# Patient Record
Sex: MALE | Race: WHITE | NOT HISPANIC OR LATINO | URBAN - METROPOLITAN AREA
[De-identification: names, ages, dates, MRNs, and addresses within clinical notes are randomized per-mention and may not be internally consistent; named-entity substitution may affect disease eponyms.]

---

## 2019-08-16 PROBLEM — Z00.00 ENCOUNTER FOR PREVENTIVE HEALTH EXAMINATION: Status: ACTIVE | Noted: 2019-08-16

## 2019-09-10 ENCOUNTER — INPATIENT (INPATIENT)
Facility: HOSPITAL | Age: 74
LOS: 3 days | Discharge: HOME | DRG: 862 | End: 2019-09-14
Attending: INTERNAL MEDICINE | Admitting: INTERNAL MEDICINE
Payer: MEDICARE

## 2019-09-10 VITALS
SYSTOLIC BLOOD PRESSURE: 144 MMHG | WEIGHT: 195.11 LBS | DIASTOLIC BLOOD PRESSURE: 58 MMHG | RESPIRATION RATE: 22 BRPM | TEMPERATURE: 100 F | HEART RATE: 69 BPM | OXYGEN SATURATION: 94 % | HEIGHT: 72 IN

## 2019-09-10 LAB
ALBUMIN SERPL ELPH-MCNC: 4.1 G/DL — SIGNIFICANT CHANGE UP (ref 3.3–5)
ALP SERPL-CCNC: 54 U/L — SIGNIFICANT CHANGE UP (ref 40–120)
ALT FLD-CCNC: 74 U/L — HIGH (ref 10–45)
ANION GAP SERPL CALC-SCNC: 11 MMOL/L — SIGNIFICANT CHANGE UP (ref 5–17)
ANION GAP SERPL CALC-SCNC: 12 MMOL/L — SIGNIFICANT CHANGE UP (ref 5–17)
APPEARANCE UR: ABNORMAL
AST SERPL-CCNC: 310 U/L — HIGH (ref 10–40)
BASOPHILS # BLD AUTO: 0.1 K/UL — SIGNIFICANT CHANGE UP (ref 0–0.2)
BASOPHILS NFR BLD AUTO: 0.4 % — SIGNIFICANT CHANGE UP (ref 0–2)
BILIRUB SERPL-MCNC: 0.9 MG/DL — SIGNIFICANT CHANGE UP (ref 0.2–1.2)
BILIRUB UR-MCNC: NEGATIVE — SIGNIFICANT CHANGE UP
BUN SERPL-MCNC: 19 MG/DL — SIGNIFICANT CHANGE UP (ref 7–23)
BUN SERPL-MCNC: 20 MG/DL — SIGNIFICANT CHANGE UP (ref 7–23)
CALCIUM SERPL-MCNC: 10.1 MG/DL — SIGNIFICANT CHANGE UP (ref 8.4–10.5)
CALCIUM SERPL-MCNC: 10.5 MG/DL — SIGNIFICANT CHANGE UP (ref 8.4–10.5)
CHLORIDE SERPL-SCNC: 102 MMOL/L — SIGNIFICANT CHANGE UP (ref 96–108)
CHLORIDE SERPL-SCNC: 104 MMOL/L — SIGNIFICANT CHANGE UP (ref 96–108)
CO2 SERPL-SCNC: 23 MMOL/L — SIGNIFICANT CHANGE UP (ref 22–31)
CO2 SERPL-SCNC: 24 MMOL/L — SIGNIFICANT CHANGE UP (ref 22–31)
COLOR SPEC: YELLOW — SIGNIFICANT CHANGE UP
CREAT SERPL-MCNC: 1.54 MG/DL — HIGH (ref 0.5–1.3)
CREAT SERPL-MCNC: 1.55 MG/DL — HIGH (ref 0.5–1.3)
DIFF PNL FLD: ABNORMAL
EOSINOPHIL # BLD AUTO: 0.1 K/UL — SIGNIFICANT CHANGE UP (ref 0–0.5)
EOSINOPHIL NFR BLD AUTO: 0.5 % — SIGNIFICANT CHANGE UP (ref 0–6)
GAS PNL BLDV: SIGNIFICANT CHANGE UP
GLUCOSE SERPL-MCNC: 114 MG/DL — HIGH (ref 70–99)
GLUCOSE SERPL-MCNC: 116 MG/DL — HIGH (ref 70–99)
GLUCOSE UR QL: NEGATIVE — SIGNIFICANT CHANGE UP
HCT VFR BLD CALC: 53.2 % — HIGH (ref 39–50)
HGB BLD-MCNC: 16.3 G/DL — SIGNIFICANT CHANGE UP (ref 13–17)
KETONES UR-MCNC: NEGATIVE — SIGNIFICANT CHANGE UP
LEUKOCYTE ESTERASE UR-ACNC: ABNORMAL
LIDOCAIN IGE QN: 14 U/L — SIGNIFICANT CHANGE UP (ref 7–60)
LYMPHOCYTES # BLD AUTO: 0.2 K/UL — LOW (ref 1–3.3)
LYMPHOCYTES # BLD AUTO: 1.3 % — LOW (ref 13–44)
MCHC RBC-ENTMCNC: 29.6 PG — SIGNIFICANT CHANGE UP (ref 27–34)
MCHC RBC-ENTMCNC: 30.7 GM/DL — LOW (ref 32–36)
MCV RBC AUTO: 96.6 FL — SIGNIFICANT CHANGE UP (ref 80–100)
MONOCYTES # BLD AUTO: 1.1 K/UL — HIGH (ref 0–0.9)
MONOCYTES NFR BLD AUTO: 6.8 % — SIGNIFICANT CHANGE UP (ref 2–14)
NEUTROPHILS # BLD AUTO: 15.4 K/UL — HIGH (ref 1.8–7.4)
NEUTROPHILS NFR BLD AUTO: 91.1 % — HIGH (ref 43–77)
NITRITE UR-MCNC: NEGATIVE — SIGNIFICANT CHANGE UP
PH UR: 6.5 — SIGNIFICANT CHANGE UP (ref 5–8)
PLATELET # BLD AUTO: 115 K/UL — LOW (ref 150–400)
POTASSIUM SERPL-MCNC: 4.3 MMOL/L — SIGNIFICANT CHANGE UP (ref 3.5–5.3)
POTASSIUM SERPL-MCNC: 6.1 MMOL/L — HIGH (ref 3.5–5.3)
POTASSIUM SERPL-SCNC: 4.3 MMOL/L — SIGNIFICANT CHANGE UP (ref 3.5–5.3)
POTASSIUM SERPL-SCNC: 6.1 MMOL/L — HIGH (ref 3.5–5.3)
PROT SERPL-MCNC: 7.5 G/DL — SIGNIFICANT CHANGE UP (ref 6–8.3)
PROT UR-MCNC: 100 — SIGNIFICANT CHANGE UP
RBC # BLD: 5.51 M/UL — SIGNIFICANT CHANGE UP (ref 4.2–5.8)
RBC # FLD: 12.5 % — SIGNIFICANT CHANGE UP (ref 10.3–14.5)
SODIUM SERPL-SCNC: 138 MMOL/L — SIGNIFICANT CHANGE UP (ref 135–145)
SODIUM SERPL-SCNC: 138 MMOL/L — SIGNIFICANT CHANGE UP (ref 135–145)
SP GR SPEC: 1.01 — SIGNIFICANT CHANGE UP (ref 1.01–1.02)
UROBILINOGEN FLD QL: NEGATIVE — SIGNIFICANT CHANGE UP
WBC # BLD: 16.9 K/UL — HIGH (ref 3.8–10.5)
WBC # FLD AUTO: 16.9 K/UL — HIGH (ref 3.8–10.5)

## 2019-09-10 PROCEDURE — 99285 EMERGENCY DEPT VISIT HI MDM: CPT | Mod: GC

## 2019-09-10 PROCEDURE — 74177 CT ABD & PELVIS W/CONTRAST: CPT | Mod: 26

## 2019-09-10 RX ORDER — ACETAMINOPHEN 500 MG
650 TABLET ORAL ONCE
Refills: 0 | Status: COMPLETED | OUTPATIENT
Start: 2019-09-10 | End: 2019-09-10

## 2019-09-10 RX ORDER — SODIUM CHLORIDE 9 MG/ML
1000 INJECTION INTRAMUSCULAR; INTRAVENOUS; SUBCUTANEOUS ONCE
Refills: 0 | Status: COMPLETED | OUTPATIENT
Start: 2019-09-10 | End: 2019-09-10

## 2019-09-10 RX ORDER — CEFTRIAXONE 500 MG/1
1000 INJECTION, POWDER, FOR SOLUTION INTRAMUSCULAR; INTRAVENOUS ONCE
Refills: 0 | Status: COMPLETED | OUTPATIENT
Start: 2019-09-10 | End: 2019-09-10

## 2019-09-10 RX ADMIN — Medication 650 MILLIGRAM(S): at 21:00

## 2019-09-10 RX ADMIN — CEFTRIAXONE 100 MILLIGRAM(S): 500 INJECTION, POWDER, FOR SOLUTION INTRAMUSCULAR; INTRAVENOUS at 22:02

## 2019-09-10 RX ADMIN — SODIUM CHLORIDE 1000 MILLILITER(S): 9 INJECTION INTRAMUSCULAR; INTRAVENOUS; SUBCUTANEOUS at 23:30

## 2019-09-10 RX ADMIN — CEFTRIAXONE 1000 MILLIGRAM(S): 500 INJECTION, POWDER, FOR SOLUTION INTRAMUSCULAR; INTRAVENOUS at 23:30

## 2019-09-10 RX ADMIN — SODIUM CHLORIDE 1000 MILLILITER(S): 9 INJECTION INTRAMUSCULAR; INTRAVENOUS; SUBCUTANEOUS at 22:02

## 2019-09-10 NOTE — ED PROVIDER NOTE - NS ED ROS FT
GENERAL: chills, //             EYES: no change in vision, //             HEENT: no trouble swallowing or speaking, //             CARDIAC: no chest pain, //              PULMONARY: no cough or SOB, //             GI: mid epigastric abd pain, //             : No changes in urination,  //            SKIN: no rashes,  //            NEURO: generalized weakness,  //             MSK: No joint pain otherwise as HPI or negative. ~Bobby Cameron DO PGY1

## 2019-09-10 NOTE — ED PROVIDER NOTE - OBJECTIVE STATEMENT
73 yo m pmh BPH, lewy body dementia, hld, pw fatigue. pw wife who provides collateral. states last week on Thursday pt had LP performed as part of study at [facility] for LBD biomarkers. afterwards he had a few episodes of  non bloody diarrhea which have resolved. yesterday morning pt had prostate biopsy performed. since then he has been excessively "fatigued", sleeping throughout yesterday and today which is unusual for pt. pt reports last night when awakening in middle of night to urinate pt was "weak" unable to stand and go to bathroom so he crawled to and from bathroom. reports chills today. denies fevers. denies dysuria, cp, sob, ha, n/v, bowel/bladder incontinence.

## 2019-09-10 NOTE — ED PROVIDER NOTE - PHYSICAL EXAMINATION
General: well appearing, interactive, well nourished, no apparent distress, ncat  HEENT: EOMI, PERRLA, normal mucosa, normal oropharynx, no lesions on the lips or on oral mucosa, normal external ear  Neck: supple, no lymphadenopathy, full range of motion, no nuchal rigidity  CV: RRR, normal S1 and S2 with no murmur, capillary refill less than two seconds  Resp: lungs CTA b/l, good aeration bilaterally, symmetric chest wall   Abd: non-distended, soft, non-tender in all 4q  : no CVA tenderness  MSK: full range of motion, no cyanosis, no edema, no clubbing, no immobility, no spinal ml ttp  Neuro: CN II-XII grossly intact, muscle strength 5/5 in all extremities, silt in all exremities  Skin: no rashes, skin intact

## 2019-09-10 NOTE — ED ADULT TRIAGE NOTE - CHIEF COMPLAINT QUOTE
fever, weakness, shaking since this morning s/p prostate biopsy yesterday. Last tylenol 11:30am today. Pt states "I couldn't walk or stand yesterday on my own last night." Lumbar puncture last Thursday.

## 2019-09-10 NOTE — ED ADULT NURSE NOTE - OBJECTIVE STATEMENT
75 y/o male with history of Lewy body dementia, presents to ED via wheelchair, accompanied by wife, c/o weakness. Patient a&ox3, with wife providing history due to patient's dementia. As per wife, patient had an LP performed last Thursday for a study on Lewy body dementia. Wife reports after procedure, patient had a few episodes of diarrhea that have since resolved. Wife reports yesterday, patient had a prostate biopsy done, placed on ABX. States patient has been feeling generalized weakness but since biopsy has felt even more weak and fatigue. Unable to stand last night. Patient got up to use bathroom last night and fell over in bed hitting right shoulder onto dresser. Had to crawl to bathroom. Patient endorses urinary frequency, but no pain with urination.  reports chills today. denies fevers. denies dysuria, cp, sob, ha, n/v, bowel/bladder incontinence 73 y/o male with history of Lewy body dementia, presents to ED via wheelchair, accompanied by wife, c/o weakness. Patient a&ox3, with wife providing history due to patient's dementia. As per wife, patient had an LP performed last Thursday for a study on Lewy body dementia. Wife reports after procedure, patient had a few episodes of diarrhea that have since resolved. Wife reports yesterday, patient had a prostate biopsy done, placed on ABX. States patient has been feeling generalized weakness but since biopsy has felt even more weak and fatigue. Unable to stand last night. Patient got up to use bathroom last night and fell over in bed hitting right shoulder onto dresser. Had to crawl to bathroom. Patient endorses urinary frequency, but no pain with urination. +chills today. Denies CP, SOB, HA, N/V/D, abdominal pain, cough, incontinence, dizziness, vision changes, blood in urine. Lungs clear b/l. Abd soft, nontender, nondistended. EKG done. MD Henriquez at bedside for eval.

## 2019-09-10 NOTE — ED PROVIDER NOTE - CLINICAL SUMMARY MEDICAL DECISION MAKING FREE TEXT BOX
75 yo m presents after LP last week and prostate biopsy w/ subjective chills and fatigue, and po temperature in ED. pt has no neuro deficits, no findings on physical exam. due to recent procedures will scan pt, obtain labs, give tylenol. likely infectious etiology. reassess.

## 2019-09-10 NOTE — ED ADULT NURSE NOTE - NSIMPLEMENTINTERV_GEN_ALL_ED
Implemented All Fall Risk Interventions:  [place] to call system. Call bell, personal items and telephone within reach. Instruct patient to call for assistance. Room bathroom lighting operational. Non-slip footwear when patient is off stretcher. Physically safe environment: no spills, clutter or unnecessary equipment. Stretcher in lowest position, wheels locked, appropriate side rails in place. Provide visual cue, wrist band, yellow gown, etc. Monitor gait and stability. Monitor for mental status changes and reorient to person, place, and time. Review medications for side effects contributing to fall risk. Reinforce activity limits and safety measures with patient and family.

## 2019-09-10 NOTE — ED PROVIDER NOTE - ATTENDING CONTRIBUTION TO CARE
Private Physician [facility], Romi BAILEY, PCP  74y male pmh Dementia,HLD,Bipolar,Pt comes to ed complains of fatigue, chills, without dysruia, Pt had LP 6d ago and prostate bx, with diarrhea following day, Yesterday had prostate bx and symptoms of fatigue since then, PE WDWN normocephalic atraumatic neck supple chest clear cv no rubs, gallops or murmurs neruo no focal defect power 5/5 all extr pain light touch intact  Dereck Griffin MD, Facep

## 2019-09-10 NOTE — ED PROVIDER NOTE - PROGRESS NOTE DETAILS
Endorsed to Dr ANDREW Griffin MD, Facep Patient reassessed, NAD, non-toxic appearing. abx started. results dw pt/family, questions answered. uro and pcp paged  - Bobby Cameron D.O. PGY2

## 2019-09-11 DIAGNOSIS — E78.5 HYPERLIPIDEMIA, UNSPECIFIED: ICD-10-CM

## 2019-09-11 DIAGNOSIS — G31.83 NEUROCOGNITIVE DISORDER WITH LEWY BODIES: ICD-10-CM

## 2019-09-11 DIAGNOSIS — N30.01 ACUTE CYSTITIS WITH HEMATURIA: ICD-10-CM

## 2019-09-11 DIAGNOSIS — A41.9 SEPSIS, UNSPECIFIED ORGANISM: ICD-10-CM

## 2019-09-11 LAB
ALBUMIN SERPL ELPH-MCNC: 3.6 G/DL — SIGNIFICANT CHANGE UP (ref 3.3–5)
ALP SERPL-CCNC: 49 U/L — SIGNIFICANT CHANGE UP (ref 40–120)
ALT FLD-CCNC: 70 U/L — HIGH (ref 10–45)
ANION GAP SERPL CALC-SCNC: 12 MMOL/L — SIGNIFICANT CHANGE UP (ref 5–17)
AST SERPL-CCNC: 262 U/L — HIGH (ref 10–40)
BASOPHILS # BLD AUTO: 0 K/UL — SIGNIFICANT CHANGE UP (ref 0–0.2)
BASOPHILS NFR BLD AUTO: 0 % — SIGNIFICANT CHANGE UP (ref 0–2)
BILIRUB SERPL-MCNC: 0.7 MG/DL — SIGNIFICANT CHANGE UP (ref 0.2–1.2)
BUN SERPL-MCNC: 20 MG/DL — SIGNIFICANT CHANGE UP (ref 7–23)
CALCIUM SERPL-MCNC: 9.7 MG/DL — SIGNIFICANT CHANGE UP (ref 8.4–10.5)
CHLORIDE SERPL-SCNC: 106 MMOL/L — SIGNIFICANT CHANGE UP (ref 96–108)
CO2 SERPL-SCNC: 22 MMOL/L — SIGNIFICANT CHANGE UP (ref 22–31)
CREAT SERPL-MCNC: 1.48 MG/DL — HIGH (ref 0.5–1.3)
E COLI DNA BLD POS QL NAA+NON-PROBE: SIGNIFICANT CHANGE UP
E COLI DNA BLD POS QL NAA+NON-PROBE: SIGNIFICANT CHANGE UP
EOSINOPHIL # BLD AUTO: 0 K/UL — SIGNIFICANT CHANGE UP (ref 0–0.5)
EOSINOPHIL NFR BLD AUTO: 0 % — SIGNIFICANT CHANGE UP (ref 0–6)
GLUCOSE SERPL-MCNC: 132 MG/DL — HIGH (ref 70–99)
GRAM STN FLD: SIGNIFICANT CHANGE UP
HCT VFR BLD CALC: 45.3 % — SIGNIFICANT CHANGE UP (ref 39–50)
HCV AB S/CO SERPL IA: 0.13 S/CO — SIGNIFICANT CHANGE UP (ref 0–0.99)
HCV AB SERPL-IMP: SIGNIFICANT CHANGE UP
HGB BLD-MCNC: 14.7 G/DL — SIGNIFICANT CHANGE UP (ref 13–17)
LYMPHOCYTES # BLD AUTO: 0.54 K/UL — LOW (ref 1–3.3)
LYMPHOCYTES # BLD AUTO: 3.5 % — LOW (ref 13–44)
MANUAL SMEAR VERIFICATION: SIGNIFICANT CHANGE UP
MCHC RBC-ENTMCNC: 30.8 PG — SIGNIFICANT CHANGE UP (ref 27–34)
MCHC RBC-ENTMCNC: 32.5 GM/DL — SIGNIFICANT CHANGE UP (ref 32–36)
MCV RBC AUTO: 94.8 FL — SIGNIFICANT CHANGE UP (ref 80–100)
METHOD TYPE: SIGNIFICANT CHANGE UP
METHOD TYPE: SIGNIFICANT CHANGE UP
MONOCYTES # BLD AUTO: 0.8 K/UL — SIGNIFICANT CHANGE UP (ref 0–0.9)
MONOCYTES NFR BLD AUTO: 5.2 % — SIGNIFICANT CHANGE UP (ref 2–14)
NEUTROPHILS # BLD AUTO: 14.04 K/UL — HIGH (ref 1.8–7.4)
NEUTROPHILS NFR BLD AUTO: 91.3 % — HIGH (ref 43–77)
PLAT MORPH BLD: NORMAL — SIGNIFICANT CHANGE UP
PLATELET # BLD AUTO: 105 K/UL — LOW (ref 150–400)
POTASSIUM SERPL-MCNC: 4.5 MMOL/L — SIGNIFICANT CHANGE UP (ref 3.5–5.3)
POTASSIUM SERPL-SCNC: 4.5 MMOL/L — SIGNIFICANT CHANGE UP (ref 3.5–5.3)
PROT SERPL-MCNC: 6.4 G/DL — SIGNIFICANT CHANGE UP (ref 6–8.3)
RBC # BLD: 4.78 M/UL — SIGNIFICANT CHANGE UP (ref 4.2–5.8)
RBC # FLD: 13.6 % — SIGNIFICANT CHANGE UP (ref 10.3–14.5)
RBC BLD AUTO: SIGNIFICANT CHANGE UP
SODIUM SERPL-SCNC: 140 MMOL/L — SIGNIFICANT CHANGE UP (ref 135–145)
SPECIMEN SOURCE: SIGNIFICANT CHANGE UP
SPECIMEN SOURCE: SIGNIFICANT CHANGE UP
WBC # BLD: 15.38 K/UL — HIGH (ref 3.8–10.5)
WBC # FLD AUTO: 15.38 K/UL — HIGH (ref 3.8–10.5)

## 2019-09-11 RX ORDER — SODIUM CHLORIDE 9 MG/ML
1000 INJECTION INTRAMUSCULAR; INTRAVENOUS; SUBCUTANEOUS
Refills: 0 | Status: DISCONTINUED | OUTPATIENT
Start: 2019-09-11 | End: 2019-09-12

## 2019-09-11 RX ORDER — CEFTRIAXONE 500 MG/1
1000 INJECTION, POWDER, FOR SOLUTION INTRAMUSCULAR; INTRAVENOUS EVERY 24 HOURS
Refills: 0 | Status: DISCONTINUED | OUTPATIENT
Start: 2019-09-11 | End: 2019-09-11

## 2019-09-11 RX ORDER — SIMVASTATIN 20 MG/1
5 TABLET, FILM COATED ORAL AT BEDTIME
Refills: 0 | Status: DISCONTINUED | OUTPATIENT
Start: 2019-09-11 | End: 2019-09-14

## 2019-09-11 RX ORDER — ACETAMINOPHEN 500 MG
650 TABLET ORAL ONCE
Refills: 0 | Status: COMPLETED | OUTPATIENT
Start: 2019-09-11 | End: 2019-09-11

## 2019-09-11 RX ORDER — ACETAMINOPHEN 500 MG
650 TABLET ORAL EVERY 6 HOURS
Refills: 0 | Status: DISCONTINUED | OUTPATIENT
Start: 2019-09-11 | End: 2019-09-14

## 2019-09-11 RX ORDER — ONDANSETRON 8 MG/1
4 TABLET, FILM COATED ORAL EVERY 6 HOURS
Refills: 0 | Status: DISCONTINUED | OUTPATIENT
Start: 2019-09-11 | End: 2019-09-14

## 2019-09-11 RX ORDER — LITHIUM CARBONATE 300 MG/1
200 TABLET, EXTENDED RELEASE ORAL
Refills: 0 | Status: DISCONTINUED | OUTPATIENT
Start: 2019-09-11 | End: 2019-09-11

## 2019-09-11 RX ORDER — DONEPEZIL HYDROCHLORIDE 10 MG/1
10 TABLET, FILM COATED ORAL AT BEDTIME
Refills: 0 | Status: DISCONTINUED | OUTPATIENT
Start: 2019-09-11 | End: 2019-09-14

## 2019-09-11 RX ORDER — LITHIUM CARBONATE 300 MG/1
450 TABLET, EXTENDED RELEASE ORAL
Refills: 0 | Status: DISCONTINUED | OUTPATIENT
Start: 2019-09-11 | End: 2019-09-14

## 2019-09-11 RX ORDER — LANOLIN ALCOHOL/MO/W.PET/CERES
10 CREAM (GRAM) TOPICAL AT BEDTIME
Refills: 0 | Status: DISCONTINUED | OUTPATIENT
Start: 2019-09-11 | End: 2019-09-14

## 2019-09-11 RX ORDER — ERTAPENEM SODIUM 1 G/1
1000 INJECTION, POWDER, LYOPHILIZED, FOR SOLUTION INTRAMUSCULAR; INTRAVENOUS EVERY 24 HOURS
Refills: 0 | Status: DISCONTINUED | OUTPATIENT
Start: 2019-09-11 | End: 2019-09-13

## 2019-09-11 RX ADMIN — Medication 650 MILLIGRAM(S): at 01:30

## 2019-09-11 RX ADMIN — Medication 650 MILLIGRAM(S): at 00:40

## 2019-09-11 RX ADMIN — ERTAPENEM SODIUM 120 MILLIGRAM(S): 1 INJECTION, POWDER, LYOPHILIZED, FOR SOLUTION INTRAMUSCULAR; INTRAVENOUS at 15:10

## 2019-09-11 RX ADMIN — LITHIUM CARBONATE 450 MILLIGRAM(S): 300 TABLET, EXTENDED RELEASE ORAL at 08:39

## 2019-09-11 RX ADMIN — Medication 10 MILLIGRAM(S): at 21:55

## 2019-09-11 RX ADMIN — Medication 650 MILLIGRAM(S): at 01:00

## 2019-09-11 RX ADMIN — DONEPEZIL HYDROCHLORIDE 10 MILLIGRAM(S): 10 TABLET, FILM COATED ORAL at 21:55

## 2019-09-11 RX ADMIN — SODIUM CHLORIDE 75 MILLILITER(S): 9 INJECTION INTRAMUSCULAR; INTRAVENOUS; SUBCUTANEOUS at 04:27

## 2019-09-11 NOTE — H&P ADULT - NSHPLABSRESULTS_GEN_ALL_CORE
LABS:                        16.3   16.9  )-----------( 115      ( 10 Sep 2019 20:58 )             53.2     09-10    138  |  104  |  19  ----------------------------<  114<H>  4.3   |  23  |  1.55<H>    Ca    10.1      10 Sep 2019 22:15    TPro  7.5  /  Alb  4.1  /  TBili  0.9  /  DBili  x   /  AST  310<H>  /  ALT  74<H>  /  AlkPhos  54  09-10      CAPILLARY BLOOD GLUCOSE            Urinalysis Basic - ( 10 Sep 2019 20:58 )    Color: Yellow / Appearance: Slightly Turbid / S.013 / pH: x  Gluc: x / Ketone: Negative  / Bili: Negative / Urobili: Negative   Blood: x / Protein: 100 / Nitrite: Negative   Leuk Esterase: Large / RBC: 17 /hpf /  /HPF   Sq Epi: x / Non Sq Epi: 0 /hpf / Bacteria: Negative        RADIOLOGY & ADDITIONAL TESTS:    Imaging Personally Reviewed:  [x] YES  [ ] NO    Consultant(s) Notes Reviewed:  [x] YES  [ ] NO    Care Discussed with Consultants/Other Providers [x] YES  [ ] NO

## 2019-09-11 NOTE — CONSULT NOTE ADULT - PROBLEM SELECTOR RECOMMENDATION 9
secondary to prostatitis  f/u blood and urine cx  change to ertapenem  monitor wbc and temp curve  ct noted

## 2019-09-11 NOTE — CONSULT NOTE ADULT - SUBJECTIVE AND OBJECTIVE BOX
HPI: pt 73y/o M PMHx BPH, lewy body dementia, hld. come to ED c/o fatigue and fever. pt had elevated PSA and s/p prostate biopsy w/ Dr. Sawyer on 2019. Pt start PO abx 2 days before the bx and cont. after the bx. Post-op pt feel well and went home. Since this morning, pt states chill and feel fatigue. frequency urination and urgency, some rectal pain and lower abdominal discomfort. Denies hematuria, dysuria and other urinary ssx. CT show inflammation of prostate.      PAST MEDICAL & SURGICAL HISTORY:  HLD (hyperlipidemia)  Lewy body dementia without behavioral disturbance    FAMILY HISTORY:    SOCIAL HISTORY:   Tobacco hx:  No Known Allergies      REVIEW OF SYSTEMS: Pertinent positives and negatives as stated in HPI, otherwise negative    Vital signs      Output      Physical Exam  Gen: NAD  Pulm: No respiratory distress, no subcostal retractions  CV: RRR, no JVD  Abd: Soft, NT, ND  : voiding free  MSK: No edema present    LABS:        09-10 @ 22:15    WBC --    / Hct --    / SCr 1.55     09-10 @ 20:58    WBC 16.9  / Hct 53.2  / SCr 1.54     09-10    138  |  104  |  19  ----------------------------<  114<H>  4.3   |  23  |  1.55<H>    Ca    10.1      10 Sep 2019 22:15    TPro  7.5  /  Alb  4.1  /  TBili  0.9  /  DBili  x   /  AST  310<H>  /  ALT  74<H>  /  AlkPhos  54  09-10      Urinalysis Basic - ( 10 Sep 2019 20:58 )    Color: Yellow / Appearance: Slightly Turbid / S.013 / pH: x  Gluc: x / Ketone: Negative  / Bili: Negative / Urobili: Negative   Blood: x / Protein: 100 / Nitrite: Negative   Leuk Esterase: Large / RBC: 17 /hpf /  /HPF   Sq Epi: x / Non Sq Epi: 0 /hpf / Bacteria: Negative        Urine Cx:   Blood Cx:    Radiology:  < from: CT Abdomen and Pelvis w/ IV Cont (09.10.19 @ 21:57) >  Inflammatory changes surrounding the prostate gland and rectum without   fluid collection in the pouch of Leroy or prostate gland, within normal   limits for recent prostate biopsy. Correlate with clinical examination to   exclude superimposed infection.     4 x 4.5 x 4.8 cm simple fluid collection anterior to the left seminal   vesicle and possibly arisingfrom the left urinary bladder, which may   represent a bladder diverticulum.     < end of copied text >

## 2019-09-11 NOTE — H&P ADULT - ASSESSMENT
74y male pmh Dementia,HLD,Bipolar,Pt comes to ed complains of fatigue, chills, without dysuria.comes in with fever found to have UTI.     1. UTI: continue on IV rocephin. followup urine culture, tylenol prn.     2. LBD: continue on lithium and 450 mg bid per wife    3 . Hyperlipidemia : continue on simvastatin 10mg qhs    4. REM sleep disorder: continue on melatonin qhs.

## 2019-09-11 NOTE — CONSULT NOTE ADULT - SUBJECTIVE AND OBJECTIVE BOX
Patient is a 74y old  Male who presents with a chief complaint of Complaints of generalized weakness (11 Sep 2019 02:26)      HPI:  75 yo m pmh BPH, Lewy body dementia, presented with gradually worsening severe fatigue. s/p prostate bx  She stated that last week on Thursday pt had LP performed as part of study at [facility] for LBD biomarkers. Afterwards he had a few episodes of  non bloody diarrhea which have resolved.Yesterday morning pt had prostate biopsy performed. since then he has been excessively "fatigued", sleeping throughout yesterday and today which is unusual for pt. pt reports last night when awakening in middle of night to urinate pt was "weak" unable to stand and go to bathroom so he crawled to and from bathroom. reports chills in er t 101 wbc 16k  ct rev nl post bx    PAST MEDICAL & SURGICAL HISTORY:  HLD (hyperlipidemia)  Lewy body dementia without behavioral disturbance      REVIEW OF SYSTEMS:    CONSTITUTIONAL:  chills  HEENT: No visual changes  NECK: No pain or stiffness  MUSCULOSKELETAL: No back pain, no joint pain  RESPIRATORY: No shortness of breath  CARDIOVASCULAR: No chest pain  GASTROINTESTINAL: mild lower abdominal pain  NEUROLOGICAL: alert  PSYCH: No depression, no mood changes  SKIN: No itching      MEDICATIONS  (STANDING):  cefTRIAXone   IVPB 1000 milliGRAM(s) IV Intermittent every 24 hours  donepezil 10 milliGRAM(s) Oral at bedtime  lithium 450 milliGRAM(s) Oral two times a day  melatonin 10 milliGRAM(s) Oral at bedtime  simvastatin 5 milliGRAM(s) Oral at bedtime  sodium chloride 0.9%. 1000 milliLiter(s) (75 mL/Hr) IV Continuous <Continuous>    MEDICATIONS  (PRN):  ondansetron Injectable 4 milliGRAM(s) IV Push every 6 hours PRN Nausea      Allergies    No Known Allergies    Intolerances        SOCIAL HISTORY: No illicit drug use    FAMILY HISTORY:      Vital Signs Last 24 Hrs  T(C): 36.7 (11 Sep 2019 08:50), Max: 39.4 (11 Sep 2019 00:40)  T(F): 98.1 (11 Sep 2019 08:50), Max: 103 (11 Sep 2019 00:40)  HR: 95 (11 Sep 2019 08:50) (61 - 95)  BP: 103/67 (11 Sep 2019 08:50) (102/59 - 144/58)  BP(mean): --  RR: 18 (11 Sep 2019 08:50) (16 - 23)  SpO2: 97% (11 Sep 2019 08:50) (94% - 98%)    PHYSICAL EXAM:    Constitutional: NAD, well-developed  HEENT: EOMI  Neck: no pain  Back: No CVA tenderness  Respiratory: No accessory respiratory muscle use  Abd: Soft, NT/ND  no organomegally  no hernia  : s/p bx deferred  Extremities: no edema  Neurological: A/O x 3  Psychiatric: Normal mood, normal affect  Skin: No rashes    I&O's Summary      LABS:                        14.7   15.38 )-----------( 105      ( 11 Sep 2019 08:33 )             45.3     -11    140  |  106  |  20  ----------------------------<  132<H>  4.5   |  22  |  1.48<H>    Ca    9.7      11 Sep 2019 06:17    TPro  6.4  /  Alb  3.6  /  TBili  0.7  /  DBili  x   /  AST  262<H>  /  ALT  70<H>  /  AlkPhos  49  09-11      Urinalysis Basic - ( 10 Sep 2019 20:58 )    Color: Yellow / Appearance: Slightly Turbid / S.013 / pH: x  Gluc: x / Ketone: Negative  / Bili: Negative / Urobili: Negative   Blood: x / Protein: 100 / Nitrite: Negative   Leuk Esterase: Large / RBC: 17 /hpf /  /HPF   Sq Epi: x / Non Sq Epi: 0 /hpf / Bacteria: Negative      Urine Culture:       RADIOLOGY & ADDITIONAL STUDIES:

## 2019-09-11 NOTE — H&P ADULT - NSHPPHYSICALEXAM_GEN_ALL_CORE
GENERAL: NAD, AAOx2  HEAD:  Atraumatic, Normocephalic  EYES: EOMI,conjunctiva and sclera clear  NECK: Supple, No JVD, No LAD  CHEST/LUNG: CTABL, No wheeze  HEART: Regular rate and rhythm; No murmurs, rubs, or gallops  ABDOMEN: Soft, Nontender, Nondistended; Bowel sounds present  EXTREMITIES:  2+ Peripheral Pulses, No clubbing, cyanosis, or edema  SKIN: No rashes or lesions

## 2019-09-11 NOTE — H&P ADULT - HISTORY OF PRESENT ILLNESS
73 yo m pmh BPH, Lewy body dementia, presented with gradually worsening severe fatigue. Hx given by wife who provides collateral. She stated that last week on Thursday pt had LP performed as part of study at [facility] for LBD biomarkers. Afterwards he had a few episodes of  non bloody diarrhea which have resolved.Yesterday morning pt had prostate biopsy performed. since then he has been excessively "fatigued", sleeping throughout yesterday and today which is unusual for pt. pt reports last night when awakening in middle of night to urinate pt was "weak" unable to stand and go to bathroom so he crawled to and from bathroom. reports chills today. denies fevers. denies dysuria, cp, sob, ha, n/v, bowel/bladder incontinence.

## 2019-09-11 NOTE — CONSULT NOTE ADULT - ASSESSMENT
pt 75y/o M PMHx BPH, lewy body dementia, hld. come to ED c/o fatigue and fever. pt had elevated PSA and s/p prostate biopsy w/ Dr. Sawyer on 9/9/2019. Pt start PO abx 2 days before the bx and cont. after the bx. Post-op pt feel well and went home. Since this morning, pt states chill and feel fatigue. frequency urination and urgency, some rectal pain and lower abdominal discomfort. Denies hematuria, dysuria and other urinary ssx. CT show inflammation of prostate. Fever 101.2 at ED, lab show elevated WBC and positive UA    plan  - Recom. medicine admission   - IV ABx  - F/U UCx and BCx  - urology will follow     case d/w Dr. Stephenson

## 2019-09-11 NOTE — CONSULT NOTE ADULT - SUBJECTIVE AND OBJECTIVE BOX
[facility], Division of Infectious Diseases  JUN Moore A. Lee  [phone]    KEVIN NICHOLS  74y, Male  [id number]    HPI--  HPI:  73 yo m pmh BPH, Lewy body dementia, presented with gradually worsening severe fatigue and chills.  States he had prostate bx done a couple days ago and was given antibx pre and post procedure bactrim and ceftriaxone.    Since then he has felt very tired. He had chills and weakness.  Prior to that, he had an LP at [place] a week ago, and has not had any new symptoms after that.  He has occ back pain, but not out of the ordinary.  He denies sick contacts.         PMH/PSH--  HLD (hyperlipidemia)  Lewy body dementia without behavioral disturbance      Allergies--PCN-- RASH      Medications--  Antibiotics: cefTRIAXone   IVPB 1000 milliGRAM(s) IV Intermittent every 24 hours    Immunologic:   Other: donepezil  lithium  melatonin  ondansetron Injectable PRN  simvastatin  sodium chloride 0.9%.      Social History--  EtOH: denies ***  Tobacco: former  Drug Use: denies ***    Family/Marital History--    Remainder not relevant to clinical concern.    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     Pertinent positives and negatives--  Constitutional: No fevers. + Chills. No Rigors.   Eyes: no blurry vision  ENMT: +rhinorrhea  Cardiovascular: No chest pain. No palpitations.  Respiratory: No shortness of breath. No cough.  Gastrointestinal: No nausea or vomiting. + diarrhea-- resolved   Genitourinary: + dysuria  Musculoskeletal: + weakess  Skin: no rash  Neurologic: occ headache  Psychiatric: no depression    Review of systems otherwise negative except as previously noted.    Physical Exam--  Vital Signs: T(F): 98.1 (09-11-19 @ 08:50), Max: 103 (09-11-19 @ 00:40)  HR: 95 (09-11-19 @ 08:50)  BP: 103/67 (09-11-19 @ 08:50)  RR: 18 (09-11-19 @ 08:50)  SpO2: 97% (09-11-19 @ 08:50)  Wt(kg): --  General: Nontoxic-appearing Male in no acute distress.  HEENT: AT/NC. Anicteric.  oropharynx clear. Dentition fair.  Neck: Not rigid. No sense of mass.  Nodes: None palpable.  Lungs: Clear bilaterally without rales, wheezing or rhonchi  Heart: Regular rate and rhythm. + Murmur.   Abdomen: Bowel sounds present and normoactive. Soft. Nondistended. Nontender.   Back: No spinal tenderness. No costovertebral angle tenderness.   Extremities: No cyanosis or clubbing. trace edema.   Skin: Warm. Dry. Good turgor. No rash. No vasculitic stigmata.  Psychiatric: Appropriate affect and mood for situation.         Laboratory & Imaging Data--  CBC                        14.7   15.38 )-----------( 105      ( 11 Sep 2019 08:33 )             45.3       Chemistries  09-11    140  |  106  |  20  ----------------------------<  132<H>  4.5   |  22  |  1.48<H>    Ca    9.7      11 Sep 2019 06:17    TPro  6.4  /  Alb  3.6  /  TBili  0.7  /  DBili  x   /  AST  262<H>  /  ALT  70<H>  /  AlkPhos  49  09-11      Culture Data  < from: CT Abdomen and Pelvis w/ IV Cont (09.10.19 @ 21:57) >  EXAM:  CT ABDOMEN AND PELVIS IC                            PROCEDURE DATE:  09/10/2019            INTERPRETATION:  CLINICAL INFORMATION: Fever, history of recent prostate   biopsy 9/9/2019.    COMPARISON: None.    PROCEDURE:   CT of the Abdomen andPelvis was performed with intravenous contrast.   Intravenous contrast: 90 ml Omnipaque 350. 10 ml discarded.  Oral contrast: None.  Sagittal and coronal reformats were performed.    FINDINGS:    LOWER CHEST: Trace bibasilar linear atelectasis. Coronary artery   calcifications.    LIVER: Within normal limits.  BILE DUCTS: Normal caliber.  GALLBLADDER: Within normal limits.  SPLEEN: Within normal limits.  PANCREAS: Within normal limits.  ADRENALS: Within normal limits.  KIDNEYS/URETERS: No hydronephrosis. Symmetric enhancement. Bilateral   punctate nonobstructing renal calculi.    BLADDER: Mild bladder wall thickening.  REPRODUCTIVE ORGANS: Prostate is mildly enlarged with mild surrounding   inflammatory changes. There is a 4 x 4.5 x 4.8 cm simple fluid collection   anterior to the left seminal vesicle and possibly arising from the left   urinary bladder (2-105, 602-44), which may represent a bladder   diverticulum.    BOWEL: Inflammatory changes surrounding the rectum and there are tiny   droplets of extraluminal gas in the pouch of Leroy without fluid   collection. No bowel obstruction. Appendix is normal.  PERITONEUM: No ascites.  VESSELS: Atherosclerotic changes.  RETROPERITONEUM/LYMPH NODES: No lymphadenopathy.    ABDOMINAL WALL: Within normal limits.  BONES: Small sclerotic foci in the right iliac bone and left hemisacrum.   Degenerative changes.    IMPRESSION:     Inflammatory changes surrounding the prostate gland and rectum without   fluid collection in the pouch of Leroy or prostate gland, within normal   limits for recent prostate biopsy. Correlate with clinical examination to   exclude superimposed infection.     4 x 4.5 x 4.8 cm simple fluid collection anterior to the left seminal   vesicle and possibly arisingfrom the left urinary bladder, which may   represent a bladder diverticulum.     Urology follow-up recommended.        < end of copied text >

## 2019-09-11 NOTE — H&P ADULT - NSHPREVIEWOFSYSTEMS_GEN_ALL_CORE
REVIEW OF SYSEMS:  General: + weakness, + fever/chills, no weight loss/gain  Skin/Breast: no rash, no jaundice  Ophthalmologic: no vision changes, no dry eyes   Respiratory and Thorax: no cough, no wheezing, no hemoptysis, no dyspnea  Cardiovascular: no chest pain, no shortness of breath, no orthopnea  Gastrointestinal: no n/v/d, no abdominal pain, no dysphagia   Genitourinary: no dysuria, no frequency, no nocturia, no hematuria  Musculoskeletal: no trauma, no sprain/strain, no myalgias,  no fracture  Neurological: no HA, no dizziness, +weakness, no numbness  Psychiatric: no depression, no SI/HI  Hematology/Lymphatics: no easy bruising  Endocrine: no heat or cold intolerance. no weight gain or loss  Allergic/Immunologic: no allergy or recent reaction

## 2019-09-12 DIAGNOSIS — R78.81 BACTEREMIA: ICD-10-CM

## 2019-09-12 LAB
-  AMIKACIN: SIGNIFICANT CHANGE UP
-  AMPICILLIN/SULBACTAM: SIGNIFICANT CHANGE UP
-  AMPICILLIN: SIGNIFICANT CHANGE UP
-  AZTREONAM: SIGNIFICANT CHANGE UP
-  CEFAZOLIN: SIGNIFICANT CHANGE UP
-  CEFEPIME: SIGNIFICANT CHANGE UP
-  CEFOXITIN: SIGNIFICANT CHANGE UP
-  CEFTRIAXONE: SIGNIFICANT CHANGE UP
-  CIPROFLOXACIN: SIGNIFICANT CHANGE UP
-  GENTAMICIN: SIGNIFICANT CHANGE UP
-  IMIPENEM: SIGNIFICANT CHANGE UP
-  LEVOFLOXACIN: SIGNIFICANT CHANGE UP
-  MEROPENEM: SIGNIFICANT CHANGE UP
-  NITROFURANTOIN: SIGNIFICANT CHANGE UP
-  PIPERACILLIN/TAZOBACTAM: SIGNIFICANT CHANGE UP
-  TIGECYCLINE: SIGNIFICANT CHANGE UP
-  TOBRAMYCIN: SIGNIFICANT CHANGE UP
-  TRIMETHOPRIM/SULFAMETHOXAZOLE: SIGNIFICANT CHANGE UP
ALBUMIN SERPL ELPH-MCNC: 3.6 G/DL — SIGNIFICANT CHANGE UP (ref 3.3–5)
ALP SERPL-CCNC: 60 U/L — SIGNIFICANT CHANGE UP (ref 40–120)
ALT FLD-CCNC: 82 U/L — HIGH (ref 10–45)
ANION GAP SERPL CALC-SCNC: 14 MMOL/L — SIGNIFICANT CHANGE UP (ref 5–17)
AST SERPL-CCNC: 231 U/L — HIGH (ref 10–40)
BASOPHILS # BLD AUTO: 0.04 K/UL — SIGNIFICANT CHANGE UP (ref 0–0.2)
BASOPHILS NFR BLD AUTO: 0.3 % — SIGNIFICANT CHANGE UP (ref 0–2)
BILIRUB SERPL-MCNC: 0.6 MG/DL — SIGNIFICANT CHANGE UP (ref 0.2–1.2)
BUN SERPL-MCNC: 17 MG/DL — SIGNIFICANT CHANGE UP (ref 7–23)
CALCIUM SERPL-MCNC: 10.3 MG/DL — SIGNIFICANT CHANGE UP (ref 8.4–10.5)
CHLORIDE SERPL-SCNC: 114 MMOL/L — HIGH (ref 96–108)
CO2 SERPL-SCNC: 22 MMOL/L — SIGNIFICANT CHANGE UP (ref 22–31)
CREAT SERPL-MCNC: 1.37 MG/DL — HIGH (ref 0.5–1.3)
CULTURE RESULTS: SIGNIFICANT CHANGE UP
EOSINOPHIL # BLD AUTO: 0.05 K/UL — SIGNIFICANT CHANGE UP (ref 0–0.5)
EOSINOPHIL NFR BLD AUTO: 0.4 % — SIGNIFICANT CHANGE UP (ref 0–6)
GLUCOSE SERPL-MCNC: 109 MG/DL — HIGH (ref 70–99)
HCT VFR BLD CALC: 47.7 % — SIGNIFICANT CHANGE UP (ref 39–50)
HGB BLD-MCNC: 15.1 G/DL — SIGNIFICANT CHANGE UP (ref 13–17)
IMM GRANULOCYTES NFR BLD AUTO: 0.3 % — SIGNIFICANT CHANGE UP (ref 0–1.5)
LYMPHOCYTES # BLD AUTO: 0.52 K/UL — LOW (ref 1–3.3)
LYMPHOCYTES # BLD AUTO: 4.3 % — LOW (ref 13–44)
MCHC RBC-ENTMCNC: 31.1 PG — SIGNIFICANT CHANGE UP (ref 27–34)
MCHC RBC-ENTMCNC: 31.7 GM/DL — LOW (ref 32–36)
MCV RBC AUTO: 98.1 FL — SIGNIFICANT CHANGE UP (ref 80–100)
METHOD TYPE: SIGNIFICANT CHANGE UP
MONOCYTES # BLD AUTO: 1.2 K/UL — HIGH (ref 0–0.9)
MONOCYTES NFR BLD AUTO: 10 % — SIGNIFICANT CHANGE UP (ref 2–14)
NEUTROPHILS # BLD AUTO: 10.19 K/UL — HIGH (ref 1.8–7.4)
NEUTROPHILS NFR BLD AUTO: 84.7 % — HIGH (ref 43–77)
ORGANISM # SPEC MICROSCOPIC CNT: SIGNIFICANT CHANGE UP
ORGANISM # SPEC MICROSCOPIC CNT: SIGNIFICANT CHANGE UP
PLATELET # BLD AUTO: 124 K/UL — LOW (ref 150–400)
POTASSIUM SERPL-MCNC: 4.2 MMOL/L — SIGNIFICANT CHANGE UP (ref 3.5–5.3)
POTASSIUM SERPL-SCNC: 4.2 MMOL/L — SIGNIFICANT CHANGE UP (ref 3.5–5.3)
PROT SERPL-MCNC: 7 G/DL — SIGNIFICANT CHANGE UP (ref 6–8.3)
RBC # BLD: 4.86 M/UL — SIGNIFICANT CHANGE UP (ref 4.2–5.8)
RBC # FLD: 13.7 % — SIGNIFICANT CHANGE UP (ref 10.3–14.5)
SODIUM SERPL-SCNC: 150 MMOL/L — HIGH (ref 135–145)
SPECIMEN SOURCE: SIGNIFICANT CHANGE UP
WBC # BLD: 12.04 K/UL — HIGH (ref 3.8–10.5)
WBC # FLD AUTO: 12.04 K/UL — HIGH (ref 3.8–10.5)

## 2019-09-12 RX ORDER — SODIUM CHLORIDE 9 MG/ML
1000 INJECTION, SOLUTION INTRAVENOUS
Refills: 0 | Status: DISCONTINUED | OUTPATIENT
Start: 2019-09-12 | End: 2019-09-13

## 2019-09-12 RX ADMIN — SIMVASTATIN 5 MILLIGRAM(S): 20 TABLET, FILM COATED ORAL at 23:53

## 2019-09-12 RX ADMIN — LITHIUM CARBONATE 450 MILLIGRAM(S): 300 TABLET, EXTENDED RELEASE ORAL at 00:02

## 2019-09-12 RX ADMIN — LITHIUM CARBONATE 450 MILLIGRAM(S): 300 TABLET, EXTENDED RELEASE ORAL at 17:07

## 2019-09-12 RX ADMIN — DONEPEZIL HYDROCHLORIDE 10 MILLIGRAM(S): 10 TABLET, FILM COATED ORAL at 23:53

## 2019-09-12 RX ADMIN — LITHIUM CARBONATE 450 MILLIGRAM(S): 300 TABLET, EXTENDED RELEASE ORAL at 06:18

## 2019-09-12 RX ADMIN — SIMVASTATIN 5 MILLIGRAM(S): 20 TABLET, FILM COATED ORAL at 00:40

## 2019-09-12 RX ADMIN — SODIUM CHLORIDE 75 MILLILITER(S): 9 INJECTION, SOLUTION INTRAVENOUS at 15:03

## 2019-09-12 RX ADMIN — ERTAPENEM SODIUM 120 MILLIGRAM(S): 1 INJECTION, POWDER, LYOPHILIZED, FOR SOLUTION INTRAMUSCULAR; INTRAVENOUS at 15:03

## 2019-09-12 RX ADMIN — Medication 10 MILLIGRAM(S): at 23:53

## 2019-09-12 NOTE — PROGRESS NOTE ADULT - PROBLEM SELECTOR PLAN 4
ecoli sensitivities pending  secondary to prostatitis  f/u urine cx  surveillance cx ordered  cont ertapenem for now

## 2019-09-13 ENCOUNTER — TRANSCRIPTION ENCOUNTER (OUTPATIENT)
Age: 74
End: 2019-09-13

## 2019-09-13 LAB
-  AMIKACIN: SIGNIFICANT CHANGE UP
-  AMIKACIN: SIGNIFICANT CHANGE UP
-  AMPICILLIN/SULBACTAM: SIGNIFICANT CHANGE UP
-  AMPICILLIN/SULBACTAM: SIGNIFICANT CHANGE UP
-  AMPICILLIN: SIGNIFICANT CHANGE UP
-  AMPICILLIN: SIGNIFICANT CHANGE UP
-  AZTREONAM: SIGNIFICANT CHANGE UP
-  AZTREONAM: SIGNIFICANT CHANGE UP
-  CEFAZOLIN: SIGNIFICANT CHANGE UP
-  CEFAZOLIN: SIGNIFICANT CHANGE UP
-  CEFEPIME: SIGNIFICANT CHANGE UP
-  CEFEPIME: SIGNIFICANT CHANGE UP
-  CEFOXITIN: SIGNIFICANT CHANGE UP
-  CEFOXITIN: SIGNIFICANT CHANGE UP
-  CEFTRIAXONE: SIGNIFICANT CHANGE UP
-  CEFTRIAXONE: SIGNIFICANT CHANGE UP
-  CIPROFLOXACIN: SIGNIFICANT CHANGE UP
-  CIPROFLOXACIN: SIGNIFICANT CHANGE UP
-  ERTAPENEM: SIGNIFICANT CHANGE UP
-  ERTAPENEM: SIGNIFICANT CHANGE UP
-  GENTAMICIN: SIGNIFICANT CHANGE UP
-  GENTAMICIN: SIGNIFICANT CHANGE UP
-  IMIPENEM: SIGNIFICANT CHANGE UP
-  IMIPENEM: SIGNIFICANT CHANGE UP
-  LEVOFLOXACIN: SIGNIFICANT CHANGE UP
-  LEVOFLOXACIN: SIGNIFICANT CHANGE UP
-  MEROPENEM: SIGNIFICANT CHANGE UP
-  MEROPENEM: SIGNIFICANT CHANGE UP
-  PIPERACILLIN/TAZOBACTAM: SIGNIFICANT CHANGE UP
-  PIPERACILLIN/TAZOBACTAM: SIGNIFICANT CHANGE UP
-  TOBRAMYCIN: SIGNIFICANT CHANGE UP
-  TOBRAMYCIN: SIGNIFICANT CHANGE UP
-  TRIMETHOPRIM/SULFAMETHOXAZOLE: SIGNIFICANT CHANGE UP
-  TRIMETHOPRIM/SULFAMETHOXAZOLE: SIGNIFICANT CHANGE UP
ALBUMIN SERPL ELPH-MCNC: 3.7 G/DL — SIGNIFICANT CHANGE UP (ref 3.3–5)
ALP SERPL-CCNC: 62 U/L — SIGNIFICANT CHANGE UP (ref 40–120)
ALT FLD-CCNC: 83 U/L — HIGH (ref 10–45)
ANION GAP SERPL CALC-SCNC: 13 MMOL/L — SIGNIFICANT CHANGE UP (ref 5–17)
AST SERPL-CCNC: 152 U/L — HIGH (ref 10–40)
BILIRUB SERPL-MCNC: 0.6 MG/DL — SIGNIFICANT CHANGE UP (ref 0.2–1.2)
BUN SERPL-MCNC: 18 MG/DL — SIGNIFICANT CHANGE UP (ref 7–23)
CALCIUM SERPL-MCNC: 10.9 MG/DL — HIGH (ref 8.4–10.5)
CHLORIDE SERPL-SCNC: 112 MMOL/L — HIGH (ref 96–108)
CO2 SERPL-SCNC: 23 MMOL/L — SIGNIFICANT CHANGE UP (ref 22–31)
CREAT SERPL-MCNC: 1.42 MG/DL — HIGH (ref 0.5–1.3)
CULTURE RESULTS: SIGNIFICANT CHANGE UP
CULTURE RESULTS: SIGNIFICANT CHANGE UP
GLUCOSE SERPL-MCNC: 118 MG/DL — HIGH (ref 70–99)
HCT VFR BLD CALC: 50.3 % — HIGH (ref 39–50)
HGB BLD-MCNC: 15.8 G/DL — SIGNIFICANT CHANGE UP (ref 13–17)
MCHC RBC-ENTMCNC: 30.4 PG — SIGNIFICANT CHANGE UP (ref 27–34)
MCHC RBC-ENTMCNC: 31.4 GM/DL — LOW (ref 32–36)
MCV RBC AUTO: 96.9 FL — SIGNIFICANT CHANGE UP (ref 80–100)
METHOD TYPE: SIGNIFICANT CHANGE UP
METHOD TYPE: SIGNIFICANT CHANGE UP
ORGANISM # SPEC MICROSCOPIC CNT: SIGNIFICANT CHANGE UP
PLATELET # BLD AUTO: 151 K/UL — SIGNIFICANT CHANGE UP (ref 150–400)
POTASSIUM SERPL-MCNC: 4.2 MMOL/L — SIGNIFICANT CHANGE UP (ref 3.5–5.3)
POTASSIUM SERPL-SCNC: 4.2 MMOL/L — SIGNIFICANT CHANGE UP (ref 3.5–5.3)
PROT SERPL-MCNC: 7.4 G/DL — SIGNIFICANT CHANGE UP (ref 6–8.3)
RBC # BLD: 5.19 M/UL — SIGNIFICANT CHANGE UP (ref 4.2–5.8)
RBC # FLD: 14 % — SIGNIFICANT CHANGE UP (ref 10.3–14.5)
SODIUM SERPL-SCNC: 148 MMOL/L — HIGH (ref 135–145)
SPECIMEN SOURCE: SIGNIFICANT CHANGE UP
SPECIMEN SOURCE: SIGNIFICANT CHANGE UP
WBC # BLD: 13.09 K/UL — HIGH (ref 3.8–10.5)
WBC # FLD AUTO: 13.09 K/UL — HIGH (ref 3.8–10.5)

## 2019-09-13 RX ORDER — CIPROFLOXACIN LACTATE 400MG/40ML
500 VIAL (ML) INTRAVENOUS EVERY 12 HOURS
Refills: 0 | Status: DISCONTINUED | OUTPATIENT
Start: 2019-09-13 | End: 2019-09-14

## 2019-09-13 RX ORDER — LITHIUM CARBONATE 300 MG/1
3 TABLET, EXTENDED RELEASE ORAL
Qty: 0 | Refills: 0 | DISCHARGE
Start: 2019-09-13

## 2019-09-13 RX ORDER — DONEPEZIL HYDROCHLORIDE 10 MG/1
1 TABLET, FILM COATED ORAL
Qty: 0 | Refills: 0 | DISCHARGE
Start: 2019-09-13

## 2019-09-13 RX ORDER — SIMVASTATIN 20 MG/1
1 TABLET, FILM COATED ORAL
Qty: 0 | Refills: 0 | DISCHARGE
Start: 2019-09-13

## 2019-09-13 RX ADMIN — Medication 10 MILLIGRAM(S): at 21:01

## 2019-09-13 RX ADMIN — DONEPEZIL HYDROCHLORIDE 10 MILLIGRAM(S): 10 TABLET, FILM COATED ORAL at 21:01

## 2019-09-13 RX ADMIN — SIMVASTATIN 5 MILLIGRAM(S): 20 TABLET, FILM COATED ORAL at 21:01

## 2019-09-13 RX ADMIN — LITHIUM CARBONATE 450 MILLIGRAM(S): 300 TABLET, EXTENDED RELEASE ORAL at 06:31

## 2019-09-13 RX ADMIN — LITHIUM CARBONATE 450 MILLIGRAM(S): 300 TABLET, EXTENDED RELEASE ORAL at 18:07

## 2019-09-13 RX ADMIN — Medication 500 MILLIGRAM(S): at 18:06

## 2019-09-13 NOTE — DISCHARGE NOTE PROVIDER - NSDCFUADDINST_GEN_ALL_CORE_FT
Follow up with your Primary care doctor within one week. Follow up with your Primary care doctor within one week.  Follow up with Urology Follow up with your Primary care doctor within one week.  Repeat liver enzymes, hold simvastatin and discuss with his doctor when to resume  Follow up with Urology

## 2019-09-13 NOTE — PROGRESS NOTE ADULT - ASSESSMENT
Post bx sepsis, improving  await cx's  cont abx per ID  check path as outpt
74M with lewy body dementia  admitted fever, chills, leukocytosis  recent prostate bx  prostatitis with bacteremia
74M with lewy body dementia  admitted fever, chills, leukocytosis  recent prostate bx  prostatitis with bacteremia
74y male pmh chris body Dementia,HLD,Bipolar,Pt comes to ed complains of fatigue, chills, without dysuria.comes in with fever found to have UTI.     1. Sepsis dt UTI/prostatitis: continue ertepenem,. bld cx-ecoli, ucx 50-90k gnr,ID cs appreciated fu recs   fu bld cx for clearence,  cs appreciated    2.Psych: bipolar dz: continue on lithium  450 mg bid per wife  Fiona dementia -stable, aricept to cont    3 . Hyperlipidemia : continue on simvastatin 10mg qhs    4. REM sleep disorder: continue on melatonin qhs.    dvt ppx and GI ppx
74y male pmh chris body Dementia,HLD,Bipolar,Pt comes to ed complains of fatigue, chills, without dysuria.comes in with fever found to have UTI.     1. Sepsis dt UTI/prostatitis: continue ertepenem,. bld cx-ecoli, ucx 50-90k gnr,ID cs appreciated fu recs   fu bld cx for clearence,  cs appreciated    2.Psych: bipolar dz: continue on lithium  450 mg bid per wife  Fiona dementia -stable, aricept to cont    3 . Hyperlipidemia : continue on simvastatin 10mg qhs    4. REM sleep disorder: continue on melatonin qhs.    dvt ppx and GI ppx
Post-prostatectomy prostatitis  - okay to discharge tomorrow on abx per ID

## 2019-09-13 NOTE — DISCHARGE NOTE PROVIDER - CARE PROVIDERS DIRECT ADDRESSES
,DirectAddress_Unknown ,DirectAddress_Unknown,[email or web address]

## 2019-09-13 NOTE — PROGRESS NOTE ADULT - SUBJECTIVE AND OBJECTIVE BOX
The patient is a Patient is a 74y old  Male who presents with a chief complaint of Complaints of generalized weakness (12 Sep 2019 14:11)      Vital Signs Last 24 Hrs  T(C): 36.8 (12 Sep 2019 09:33), Max: 36.8 (12 Sep 2019 01:19)  T(F): 98.2 (12 Sep 2019 09:33), Max: 98.3 (12 Sep 2019 01:19)  HR: 60 (12 Sep 2019 09:33) (58 - 62)  BP: 124/81 (12 Sep 2019 09:33) (97/54 - 124/81)  BP(mean): --  RR: 18 (12 Sep 2019 09:33) (18 - 18)  SpO2: 97% (12 Sep 2019 09:) (97% - 98%)    ROS  Normal respiration  No chest pain    Physical exam  alert  nl respiratory effort  no cva tenderness    Urine:     I&O's Summary    11 Sep 2019 07:  -  12 Sep 2019 07:00  --------------------------------------------------------  IN: 240 mL / OUT: 1750 mL / NET: -1510 mL    12 Sep 2019 07:01  -  12 Sep 2019 19:08  --------------------------------------------------------  IN: 3300 mL / OUT: 950 mL / NET: 2350 mL    Feeling better  mariola liquids, but poor appetite  Ucx, Bld cx +GNR  WBC, Cr trending to baseline    LABS:                        15.1   12.04 )-----------( 124      ( 12 Sep 2019 10:07 )             47.7     09-12    150<H>  |  114<H>  |  17  ----------------------------<  109<H>  4.2   |  22  |  1.37<H>    Ca    10.3      12 Sep 2019 07:37    TPro  7.0  /  Alb  3.6  /  TBili  0.6  /  DBili  x   /  AST  231<H>  /  ALT  82<H>  /  AlkPhos  60  09-12    Urinalysis Basic - ( 10 Sep 2019 20:58 )    Color: Yellow / Appearance: Slightly Turbid / S.013 / pH: x  Gluc: x / Ketone: Negative  / Bili: Negative / Urobili: Negative   Blood: x / Protein: 100 / Nitrite: Negative   Leuk Esterase: Large / RBC: 17 /hpf /  /HPF   Sq Epi: x / Non Sq Epi: 0 /hpf / Bacteria: Negative      Urine Culture:       Radiology    Prior notes/chart reviewed.
[facility], Division of Infectious Diseases  [facility]  [phone]    Name: KEVIN NICHOLS  Age: 74y  Gender: Male  MRN: [id number]    Interval History--  Notes reviewed  no new complaints  no events overnight     Past Medical History--  HLD (hyperlipidemia)  Lewy body dementia without behavioral disturbance      For details regarding the patient's social history, family history, and other miscellaneous elements, please refer the initial infectious diseases consultation and/or the admitting history and physical examination for this admission.    Allergies    No Known Allergies    Intolerances        Medications--  Antibiotics:  ertapenem  IVPB 1000 milliGRAM(s) IV Intermittent every 24 hours    Immunologic:    Other:  acetaminophen   Tablet .. PRN  donepezil  lithium  melatonin  ondansetron Injectable PRN  simvastatin  sodium chloride 0.45%.      Review of Systems--  A 10-point review of systems was obtained.     Pertinent positives and negatives--  Constitutional: No fevers. No Chills. No Rigors.   Cardiovascular: No chest pain. No palpitations.  Respiratory: No shortness of breath. No cough.  Gastrointestinal: No nausea or vomiting. No diarrhea or constipation.   Psychiatric: no depression    Review of systems otherwise negative except as previously noted.    Physical Examination--  Vital Signs: T(F): 98.2 (09-12-19 @ 09:33), Max: 98.4 (09-11-19 @ 15:37)  HR: 60 (09-12-19 @ 09:33)  BP: 124/81 (09-12-19 @ 09:33)  RR: 18 (09-12-19 @ 09:33)  SpO2: 97% (09-12-19 @ 09:33)  Wt(kg): --  General: Nontoxic-appearing Male in no acute distress.  HEENT: AT/NC. Anicteric. Conjunctiva pink and moist. Oropharynx clear. Dentition fair.  Neck: Not rigid. No sense of mass.  Nodes: None palpable.  Lungs: Clear bilaterally without rales, wheezing or rhonchi  Heart: Regular rate and rhythm. No Murmur. No rub.   Abdomen: Bowel sounds present and normoactive. Soft. Nondistended. Nontender.   Back: No spinal tenderness. No costovertebral angle tenderness.   Extremities: No cyanosis or clubbing. No edema.   Skin: Warm. Dry. Good turgor. No rash. No vasculitic stigmata.  Psychiatric: Appropriate affect and mood for situation.         Laboratory Studies--  CBC                        15.1   12.04 )-----------( 124      ( 12 Sep 2019 10:07 )             47.7       Chemistries  09-12    150<H>  |  114<H>  |  17  ----------------------------<  109<H>  4.2   |  22  |  1.37<H>    Ca    10.3      12 Sep 2019 07:37    TPro  7.0  /  Alb  3.6  /  TBili  0.6  /  DBili  x   /  AST  231<H>  /  ALT  82<H>  /  AlkPhos  60  09-12      Culture Data    Culture - Urine (collected 11 Sep 2019 01:24)  Source: .Urine  Preliminary Report (12 Sep 2019 05:00):    50,000 - 99,000 CFU/mL Gram Negative Rods    Culture - Blood (collected 11 Sep 2019 01:18)  Source: .Blood  Gram Stain (11 Sep 2019 13:26):    Growth in aerobic bottle: Gram Negative Rods    Growth in anaerobic bottle: Gram Negative Rods  Preliminary Report (12 Sep 2019 10:20):    Growth in aerobic and anaerobic bottles: Escherichia coli    "Due to technical problems, Proteus sp. will Not be reported as part of    the BCID panel until further notice"    ***Blood Panel PCR results on this specimen are available    approximately 3 hours after the Gram stain result.***    Gram stain, PCR, and/or culture results may not always    correspond due to difference in methodologies.    ************************************************************    This PCR assay was performed using [employer].    The following targets are tested for: Enterococcus,    vancomycin resistant enterococci, Listeria monocytogenes,    coagulase negative staphylococci, S. aureus,    methicillin resistant S. aureus, Streptococcus agalactiae    (Group B), S. pneumoniae, S.pyogenes (Group A),    Acinetobacter baumannii, Enterobacter cloacae, E. coli,    Klebsiella oxytoca, K. pneumoniae, Proteus sp.,    Serratia marcescens, Haemophilus influenzae,    Neisseria meningitidis, Pseudomonas aeruginosa, Candida    albicans, C. glabrata, C krusei, C parapsilosis,    C. tropicalis and the KPC resistance gene.  Organism: Blood Culture PCR (11 Sep 2019 14:07)  Organism: Blood Culture PCR (11 Sep 2019 14:07)    Culture - Blood (collected 11 Sep 2019 01:18)  Source: .Blood  Gram Stain (11 Sep 2019 11:49):    Growth in aerobic and anaerobic bottles: Gram Negative Rods  Preliminary Report (12 Sep 2019 11:43):    Growth in aerobic and anaerobic bottles: Escherichia coli    "Due to technical problems, Proteus sp. will Not be reported as part of    the BCID panel until further notice"    ***Blood Panel PCR results on this specimen are available    approximately 3 hours after the Gram stain result.***    Gram stain, PCR, and/or culture results may not always    correspond due to difference in methodologies.    ************************************************************    This PCR assay was performed using [employer].    The following targets are tested for: Enterococcus,    vancomycin resistant enterococci, Listeria monocytogenes,    coagulase negative staphylococci, S. aureus,    methicillin resistant S. aureus, Streptococcus agalactiae    (Group B), S. pneumoniae, S.pyogenes (Group A),    Acinetobacter baumannii, Enterobacter cloacae, E. coli,    Klebsiella oxytoca, K. pneumoniae, Proteus sp.,    Serratia marcescens, Haemophilus influenzae,    Neisseria meningitidis, Pseudomonas aeruginosa, Candida    albicans, C. glabrata, C krusei, C parapsilosis,    C. tropicalis and the KPC resistance gene.  Organism: Blood Culture PCR (11 Sep 2019 13:43)  Organism: Blood Culture PCR (11 Sep 2019 13:43)      < from: CT Abdomen and Pelvis w/ IV Cont (09.10.19 @ 21:57) >  EXAM:  CT ABDOMEN AND PELVIS IC                            PROCEDURE DATE:  09/10/2019            INTERPRETATION:  CLINICAL INFORMATION: Fever, history of recent prostate   biopsy 9/9/2019.    COMPARISON: None.    PROCEDURE:   CT of the Abdomen andPelvis was performed with intravenous contrast.   Intravenous contrast: 90 ml Omnipaque 350. 10 ml discarded.  Oral contrast: None.  Sagittal and coronal reformats were performed.    FINDINGS:    LOWER CHEST: Trace bibasilar linear atelectasis. Coronary artery   calcifications.    LIVER: Within normal limits.  BILE DUCTS: Normal caliber.  GALLBLADDER: Within normal limits.  SPLEEN: Within normal limits.  PANCREAS: Within normal limits.  ADRENALS: Within normal limits.  KIDNEYS/URETERS: No hydronephrosis. Symmetric enhancement. Bilateral   punctate nonobstructing renal calculi.    BLADDER: Mild bladder wall thickening.  REPRODUCTIVE ORGANS: Prostate is mildly enlarged with mild surrounding   inflammatory changes. There is a 4 x 4.5 x 4.8 cm simple fluid collection   anterior to the left seminal vesicle and possibly arising from the left   urinary bladder (2-105, 602-44), which may represent a bladder   diverticulum.    BOWEL: Inflammatory changes surrounding the rectum and there are tiny   droplets of extraluminal gas in the pouch of Leroy without fluid   collection. No bowel obstruction. Appendix is normal.  PERITONEUM: No ascites.  VESSELS: Atherosclerotic changes.  RETROPERITONEUM/LYMPH NODES: No lymphadenopathy.    ABDOMINAL WALL: Within normal limits.  BONES: Small sclerotic foci in the right iliac bone and left hemisacrum.   Degenerative changes.    IMPRESSION:     Inflammatory changes surrounding the prostate gland and rectum without   fluid collection in the pouch of Leroy or prostate gland, within normal   limits for recent prostate biopsy. Correlate with clinical examination to   exclude superimposed infection.     4 x 4.5 x 4.8 cm simple fluid collection anterior to the left seminal   vesicle and possibly arisingfrom the left urinary bladder, which may     < end of copied text >
Patient is a 74y old  Male who presents with a chief complaint of Complaints of generalized weakness (12 Sep 2019 19:07)      INTERVAL HPI/OVERNIGHT EVENTS: feels well, improved weakness      Vital Signs Last 24 Hrs  T(C): 37 (12 Sep 2019 19:57), Max: 37 (12 Sep 2019 19:57)  T(F): 98.6 (12 Sep 2019 19:57), Max: 98.6 (12 Sep 2019 19:57)  HR: 60 (12 Sep 2019 19:57) (58 - 62)  BP: 122/73 (12 Sep 2019 19:57) (97/54 - 124/81)  BP(mean): --  RR: 18 (12 Sep 2019 19:57) (18 - 18)  SpO2: 97% (12 Sep 2019 19:57) (97% - 98%)    acetaminophen   Tablet .. 650 milliGRAM(s) Oral every 6 hours PRN  donepezil 10 milliGRAM(s) Oral at bedtime  ertapenem  IVPB 1000 milliGRAM(s) IV Intermittent every 24 hours  lithium 450 milliGRAM(s) Oral two times a day  melatonin 10 milliGRAM(s) Oral at bedtime  ondansetron Injectable 4 milliGRAM(s) IV Push every 6 hours PRN  simvastatin 5 milliGRAM(s) Oral at bedtime  sodium chloride 0.45%. 1000 milliLiter(s) IV Continuous <Continuous>      PHYSICAL EXAM:  GENERAL: NAD,   EYES: conjunctiva and sclera clear  ENMT: Moist mucous membranes  NECK: Supple, No JVD, Normal thyroid  NERVOUS SYSTEM:  Alert & Oriented X,   CHEST/LUNG: Clear to auscultation bilaterally; No rales, rhonchi, wheezing, or rubs  HEART: Regular rate and rhythm; No murmurs, rubs, or gallops  ABDOMEN: Soft, Nontender, Nondistended; Bowel sounds present  EXTREMITIES:  2+ Peripheral Pulses, No clubbing, cyanosis, or edema  LYMPH: No lymphadenopathy noted  SKIN: No rashes or lesions    Consultant(s) Notes Reviewed:  [x ] YES  [ ] NO  Care Discussed with Consultants/Other Providers [ x] YES  [ ] NO    LABS:                        15.1   12.04 )-----------( 124      ( 12 Sep 2019 10:07 )             47.7     09-12    150<H>  |  114<H>  |  17  ----------------------------<  109<H>  4.2   |  22  |  1.37<H>    Ca    10.3      12 Sep 2019 07:37    TPro  7.0  /  Alb  3.6  /  TBili  0.6  /  DBili  x   /  AST  231<H>  /  ALT  82<H>  /  AlkPhos  60  09-12        CAPILLARY BLOOD GLUCOSE                Culture - Urine (collected 11 Sep 2019 01:24)  Source: .Urine  Preliminary Report (12 Sep 2019 05:00):    50,000 - 99,000 CFU/mL Gram Negative Rods    Culture - Blood (collected 11 Sep 2019 01:18)  Source: .Blood  Gram Stain (11 Sep 2019 13:26):    Growth in aerobic bottle: Gram Negative Rods    Growth in anaerobic bottle: Gram Negative Rods  Preliminary Report (12 Sep 2019 10:20):    Growth in aerobic and anaerobic bottles: Escherichia coli    "Due to technical problems, Proteus sp. will Not be reported as part of    the BCID panel until further notice"    ***Blood Panel PCR results on this specimen are available    approximately 3 hours after the Gram stain result.***    Gram stain, PCR, and/or culture results may not always    correspond due to difference in methodologies.    ************************************************************    This PCR assay was performed using [employer].    The following targets are tested for: Enterococcus,    vancomycin resistant enterococci, Listeria monocytogenes,    coagulase negative staphylococci, S. aureus,    methicillin resistant S. aureus, Streptococcus agalactiae    (Group B), S. pneumoniae, S.pyogenes (Group A),    Acinetobacter baumannii, Enterobacter cloacae, E. coli,    Klebsiella oxytoca, K. pneumoniae, Proteus sp.,    Serratia marcescens, Haemophilus influenzae,    Neisseria meningitidis, Pseudomonas aeruginosa, Candida    albicans, C. glabrata, C krusei, C parapsilosis,    C. tropicalis and the KPC resistance gene.  Organism: Blood Culture PCR (11 Sep 2019 14:07)  Organism: Blood Culture PCR (11 Sep 2019 14:07)    Culture - Blood (collected 11 Sep 2019 01:18)  Source: .Blood  Gram Stain (11 Sep 2019 11:49):    Growth in aerobic and anaerobic bottles: Gram Negative Rods  Preliminary Report (12 Sep 2019 11:43):    Growth in aerobic and anaerobic bottles: Escherichia coli    "Due to technical problems, Proteus sp. will Not be reported as part of    the BCID panel until further notice"    ***Blood Panel PCR results on this specimen are available    approximately 3 hours after the Gram stain result.***    Gram stain, PCR, and/or culture results may not always    correspond due to difference in methodologies.    ************************************************************    This PCR assay was performed using [employer].    The following targets are tested for: Enterococcus,    vancomycin resistant enterococci, Listeria monocytogenes,    coagulase negative staphylococci, S. aureus,    methicillin resistant S. aureus, Streptococcus agalactiae    (Group B), S. pneumoniae, S.pyogenes (Group A),    Acinetobacter baumannii, Enterobacter cloacae, E. coli,    Klebsiella oxytoca, K. pneumoniae, Proteus sp.,    Serratia marcescens, Haemophilus influenzae,    Neisseria meningitidis, Pseudomonas aeruginosa, Candida    albicans, C. glabrata, C krusei, C parapsilosis,    C. tropicalis and the KPC resistance gene.  Organism: Blood Culture PCR (11 Sep 2019 13:43)  Organism: Blood Culture PCR (11 Sep 2019 13:43)        RADIOLOGY & ADDITIONAL TESTS:    Imaging Personally Reviewed:  [x ] YES  [ ] NO
Patient is a 74y old  Male who presents with a chief complaint of Complaints of generalized weakness (13 Sep 2019 20:15)      INTERVAL HPI/OVERNIGHT EVENTS: noted, feels well      Vital Signs Last 24 Hrs  T(C): 37 (13 Sep 2019 20:36), Max: 37 (13 Sep 2019 20:36)  T(F): 98.6 (13 Sep 2019 20:36), Max: 98.6 (13 Sep 2019 20:36)  HR: 54 (13 Sep 2019 20:36) (52 - 54)  BP: 131/77 (13 Sep 2019 20:36) (131/77 - 132/70)  BP(mean): --  RR: 18 (13 Sep 2019 20:36) (18 - 18)  SpO2: 97% (13 Sep 2019 20:36) (97% - 97%)    acetaminophen   Tablet .. 650 milliGRAM(s) Oral every 6 hours PRN  ciprofloxacin     Tablet 500 milliGRAM(s) Oral every 12 hours  donepezil 10 milliGRAM(s) Oral at bedtime  lithium 450 milliGRAM(s) Oral two times a day  melatonin 10 milliGRAM(s) Oral at bedtime  ondansetron Injectable 4 milliGRAM(s) IV Push every 6 hours PRN  simvastatin 5 milliGRAM(s) Oral at bedtime      PHYSICAL EXAM:  GENERAL: NAD,   EYES: conjunctiva and sclera clear  ENMT: Moist mucous membranes  NECK: Supple, No JVD, Normal thyroid  NERVOUS SYSTEM:  Alert & Oriented X,   CHEST/LUNG: Clear to auscultation bilaterally; No rales, rhonchi, wheezing, or rubs  HEART: Regular rate and rhythm; No murmurs, rubs, or gallops  ABDOMEN: Soft, Nontender, Nondistended; Bowel sounds present  EXTREMITIES:  2+ Peripheral Pulses, No clubbing, cyanosis, or edema  LYMPH: No lymphadenopathy noted  SKIN: No rashes or lesions    Consultant(s) Notes Reviewed:  [x ] YES  [ ] NO  Care Discussed with Consultants/Other Providers [ x] YES  [ ] NO    LABS:                        15.8   13.09 )-----------( 151      ( 13 Sep 2019 10:12 )             50.3     09-13    148<H>  |  112<H>  |  18  ----------------------------<  118<H>  4.2   |  23  |  1.42<H>    Ca    10.9<H>      13 Sep 2019 07:09    TPro  7.4  /  Alb  3.7  /  TBili  0.6  /  DBili  x   /  AST  152<H>  /  ALT  83<H>  /  AlkPhos  62  09-13        CAPILLARY BLOOD GLUCOSE                Culture - Blood (collected 12 Sep 2019 14:52)  Source: .Blood  Preliminary Report (13 Sep 2019 15:01):    No growth to date.    Culture - Blood (collected 12 Sep 2019 14:52)  Source: .Blood  Preliminary Report (13 Sep 2019 15:01):    No growth to date.    Culture - Urine (collected 11 Sep 2019 01:24)  Source: .Urine  Final Report (12 Sep 2019 23:38):    50,000 - 99,000 CFU/mL Escherichia coli  Organism: Escherichia coli (12 Sep 2019 23:38)  Organism: Escherichia coli (12 Sep 2019 23:38)    Culture - Blood (collected 11 Sep 2019 01:18)  Source: .Blood  Gram Stain (11 Sep 2019 13:26):    Growth in aerobic bottle: Gram Negative Rods    Growth in anaerobic bottle: Gram Negative Rods  Final Report (13 Sep 2019 13:29):    Growth in aerobic and anaerobic bottles: Escherichia coli    "Due to technical problems, Proteus sp. will Not be reported as part of    the BCID panel until further notice"    ***Blood Panel PCR results on this specimen are available    approximately 3 hours after the Gram stain result.***    Gram stain, PCR, and/or culture results may not always    correspond due to difference in methodologies.    ************************************************************    This PCR assay was performed using [employer].    The following targets are tested for: Enterococcus,    vancomycin resistant enterococci, Listeria monocytogenes,    coagulase negative staphylococci, S. aureus,    methicillin resistant S. aureus, Streptococcus agalactiae    (Group B), S. pneumoniae, S.pyogenes (Group A),    Acinetobacter baumannii, Enterobacter cloacae, E. coli,    Klebsiella oxytoca, K. pneumoniae, Proteus sp.,    Serratia marcescens, Haemophilus influenzae,    Neisseria meningitidis, Pseudomonas aeruginosa, Candida    albicans, C. glabrata, C krusei, C parapsilosis,    C. tropicalis and the KPC resistance gene.  Organism: Blood Culture PCR  Escherichia coli (13 Sep 2019 13:29)  Organism: Escherichia coli (13 Sep 2019 13:29)  Organism: Blood Culture PCR (13 Sep 2019 13:29)    Culture - Blood (collected 11 Sep 2019 01:18)  Source: .Blood  Gram Stain (11 Sep 2019 11:49):    Growth in aerobic and anaerobic bottles: Gram Negative Rods  Final Report (13 Sep 2019 10:27):    Growth in aerobic and anaerobic bottles: Escherichia coli    "Due to technical problems, Proteus sp. will Not be reported as part of    the BCID panel until further notice"    ***Blood Panel PCR results on this specimen are available    approximately 3 hours after the Gram stain result.***    Gram stain, PCR, and/or culture results may not always    correspond due to difference in methodologies.    ************************************************************    This PCR assay was performed using [employer].    The following targets are tested for: Enterococcus,    vancomycin resistant enterococci, Listeria monocytogenes,    coagulase negative staphylococci, S. aureus,    methicillin resistant S. aureus, Streptococcus agalactiae    (Group B), S. pneumoniae, S.pyogenes (Group A),    Acinetobacter baumannii, Enterobacter cloacae, E. coli,    Klebsiella oxytoca, K. pneumoniae, Proteus sp.,    Serratia marcescens, Haemophilus influenzae,    Neisseria meningitidis, Pseudomonas aeruginosa, Candida    albicans, C. glabrata, C krusei, C parapsilosis,    C. tropicalis and the KPC resistance gene.  Organism: Blood Culture PCR  Escherichia coli (13 Sep 2019 10:27)  Organism: Escherichia coli (13 Sep 2019 10:27)  Organism: Blood Culture PCR (13 Sep 2019 10:27)        RADIOLOGY & ADDITIONAL TESTS:    Imaging Personally Reviewed:  [x ] YES  [ ] NO
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 (13 Sep 2019 12:48)  Feels much better  Denies fever  Denies dysuria and hematuria    VITAL SIGNS  Vital Signs Last 24 Hrs  T(C): 36.5 (13 Sep 2019 13:03), Max: 36.5 (13 Sep 2019 13:03)  T(F): 97.7 (13 Sep 2019 13:03), Max: 97.7 (13 Sep 2019 13:03)  HR: 52 (13 Sep 2019 13:03) (52 - 52)  BP: 132/70 (13 Sep 2019 13:03) (132/70 - 132/70)  BP(mean): --  RR: 18 (13 Sep 2019 13:03) (18 - 18)  SpO2: 97% (13 Sep 2019 13:03) (97% - 97%)  I&O's Summary    12 Sep 2019 07:01  -  13 Sep 2019 07:00  --------------------------------------------------------  IN: 4440 mL / OUT: 951 mL / NET: 3489 mL    13 Sep 2019 07:01  -  13 Sep 2019 20:15  --------------------------------------------------------  IN: 240 mL / OUT: 0 mL / NET: 240 mL        PHYSICAL EXAM:  General: well appearing, sitting at edge of bed   Abdomen: soft, nontender, nondistended      LABS:                        15.8   13.09 )-----------( 151      ( 13 Sep 2019 10:12 )             50.3     09-13    148<H>  |  112<H>  |  18  ----------------------------<  118<H>  4.2   |  23  |  1.42<H>    Ca    10.9<H>      13 Sep 2019 07:09    TPro  7.4  /  Alb  3.7  /  TBili  0.6  /  DBili  x   /  AST  152<H>  /  ALT  83<H>  /  AlkPhos  62  09-13      Urine Culture: --  Escherichia coli  --  Blood Culture PCR  Escherichia coli - on bactrim        Prior notes/chart reviewed.
infectious diseases progress note:    KEVIN NICHOLS is a 74y y. o. Male patient    Patient reports: "anxiousa to leave, no current concerns, everything feeling better"    ROS:    EYES:  Negative  blurry vision or double vision  GASTROINTESTINAL:  Negative for nausea, vomiting, diarrhea  -otherwise negative except for subjective    Allergies    No Known Allergies    Intolerances        ANTIBIOTICS/RELEVANT:  antimicrobials  ertapenem  IVPB 1000 milliGRAM(s) IV Intermittent every 24 hours    immunologic:    OTHER:  acetaminophen   Tablet .. 650 milliGRAM(s) Oral every 6 hours PRN  donepezil 10 milliGRAM(s) Oral at bedtime  lithium 450 milliGRAM(s) Oral two times a day  melatonin 10 milliGRAM(s) Oral at bedtime  ondansetron Injectable 4 milliGRAM(s) IV Push every 6 hours PRN  simvastatin 5 milliGRAM(s) Oral at bedtime  sodium chloride 0.45%. 1000 milliLiter(s) IV Continuous <Continuous>      Objective:  Vital Signs Last 24 Hrs  T(C): 37 (12 Sep 2019 19:57), Max: 37 (12 Sep 2019 19:57)  T(F): 98.6 (12 Sep 2019 19:57), Max: 98.6 (12 Sep 2019 19:57)  HR: 60 (12 Sep 2019 19:57) (60 - 60)  BP: 122/73 (12 Sep 2019 19:57) (122/73 - 122/73)  BP(mean): --  RR: 18 (12 Sep 2019 19:57) (18 - 18)  SpO2: 97% (12 Sep 2019 19:57) (97% - 97%)    T(C): 37 (09-12-19 @ 19:57), Max: 37 (09-12-19 @ 19:57)  T(C): 37 (09-12-19 @ 19:57), Max: 39.4 (09-11-19 @ 00:40)  T(C): 37 (09-12-19 @ 19:57), Max: 39.4 (09-11-19 @ 00:40)    PHYSICAL EXAM:  Constitutional: Well-developed, well nourished  Eyes: PERRLA, EOMI  Ear/Nose/Throat: oropharynx normal	  Neck: no JVD, no lymphadenopathy, supple  Respiratory: no accessory muscle use  Cardiovascular: RRR,   Gastrointestinal: soft, NT  Extremities: no clubbing, no cyanosis, edema absent      LABS:                        15.8   13.09 )-----------( 151      ( 13 Sep 2019 10:12 )             50.3       13.09 09-13 @ 10:12  12.04 09-12 @ 10:07  15.38 09-11 @ 08:33  16.9 09-10 @ 20:58      09-13    148<H>  |  112<H>  |  18  ----------------------------<  118<H>  4.2   |  23  |  1.42<H>    Ca    10.9<H>      13 Sep 2019 07:09    TPro  7.4  /  Alb  3.7  /  TBili  0.6  /  DBili  x   /  AST  152<H>  /  ALT  83<H>  /  AlkPhos  62  09-13      Creatinine, Serum: 1.42 mg/dL (09-13-19 @ 07:09)  Creatinine, Serum: 1.37 mg/dL (09-12-19 @ 07:37)  Creatinine, Serum: 1.48 mg/dL (09-11-19 @ 06:17)  Creatinine, Serum: 1.55 mg/dL (09-10-19 @ 22:15)  Creatinine, Serum: 1.54 mg/dL (09-10-19 @ 20:58)    MICROBIOLOGY:    Culture - Urine (09.11.19 @ 01:24)    -  Amikacin: S <=16    -  Ampicillin: R >16 These ampicillin results predict results for amoxicillin    -  Ampicillin/Sulbactam: R >16/8 Enterobacter, Citrobacter, and Serratia may develop resistance during prolonged therapy (3-4 days)    -  Aztreonam: S <=4    -  Cefazolin: S 16 (MIC_CL_COM_[email or web address]) For uncomplicated UTI with K. pneumoniae, E. coli, or P. mirablis: REUBEN <=16 is sensitive and REUBEN >=32 is resistant. This also predicts results for oral agents cefaclor, cefdinir, cefpodoxime, cefprozil, cefuroxime axetil, cephalexin and locarbef for uncomplicated UTI. Note that some isolates may be susceptible to these agents while testing resistant to cefazolin.    -  Cefepime: S <=4    -  Cefoxitin: S <=8    -  Ceftriaxone: S <=1 Enterobacter, Citrobacter, and Serratia may develop resistance during prolonged therapy    -  Ciprofloxacin: S <=2    -  Gentamicin: R >8    -  Imipenem: S <=1    -  Levofloxacin: S <=2    -  Meropenem: S <=1    -  Nitrofurantoin: S <=32 Should not be used to treat pyelonephritis    -  Piperacillin/Tazobactam: S <=16    -  Tigecycline: S <=2    -  Tobramycin: R >8    -  Trimethoprim/Sulfamethoxazole: R >2/38    Specimen Source: .Urine    Culture Results:   50,000 - 99,000 CFU/mL Escherichia coli    Organism Identification: Escherichia coli    Organism: Escherichia coli    Method Type: REUBEN    Culture - Blood (09.11.19 @ 01:18)    Gram Stain:   Growth in aerobic and anaerobic bottles: Gram Negative Rods    -  Ampicillin: R >16 These ampicillin results predict results for amoxicillin    -  Amikacin: S <=16    -  Escherichia coli: Detec    -  Trimethoprim/Sulfamethoxazole: R >2/38    -  Tobramycin: R >8    -  Piperacillin/Tazobactam: S <=8    -  Meropenem: S <=1    -  Levofloxacin: S <=2    -  Imipenem: S <=1    -  Gentamicin: R >8    -  Ciprofloxacin: S <=1    -  Ertapenem: S <=0.5    -  Ceftriaxone: S <=1 Enterobacter, Citrobacter, and Serratia may develop resistance during prolonged therapy    -  Cefoxitin: S <=8    -  Cefepime: S <=2    -  Cefazolin: S 8 Enterobacter, Citrobacter, and Serratia may develop resistance during prolonged therapy (3-4 days)    -  Aztreonam: S <=4    -  Ampicillin/Sulbactam: R >16/8 Enterobacter, Citrobacter, and Serratia may develop resistance during prolonged therapy (3-4 days)    Specimen Source: .Blood    Organism: Blood Culture PCR    Organism: Escherichia coli    Culture Results:   Growth in aerobic and anaerobic bottles: Escherichia coli  Escherichia coli    Method Type: PCR    Method Type: REUBEN        RADIOLOGY & ADDITIONAL STUDIES:

## 2019-09-13 NOTE — PATIENT PROFILE ADULT - ABILITY TO HEAR (WITH HEARING AID OR HEARING APPLIANCE IF NORMALLY USED):
Anesthesia Evaluation     Patient summary reviewed and Nursing notes reviewed   no history of anesthetic complications:  NPO Solid Status: > 8 hours  NPO Liquid Status: > 8 hours           Airway   Mallampati: I  TM distance: >3 FB  Neck ROM: full  no difficulty expected  Dental - normal exam     Pulmonary - negative pulmonary ROS and normal exam   Cardiovascular - normal exam    (+) hypertension, hyperlipidemia,       Neuro/Psych  (+) dizziness/light headedness, syncope, dementia, poor historian.,     GI/Hepatic/Renal/Endo    (+)  GERD,  diabetes mellitus type 2, hypothyroidism,     Musculoskeletal     Abdominal    Substance History - negative use     OB/GYN negative ob/gyn ROS         Other   (+) arthritis                     Anesthesia Plan    ASA 3     MAC     intravenous induction   Anesthetic plan, all risks, benefits, and alternatives have been provided, discussed and informed consent has been obtained with: patient.       Adequate: hears normal conversation without difficulty

## 2019-09-13 NOTE — DISCHARGE NOTE PROVIDER - CARE PROVIDER_API CALL
Abduolaye Llanos)  Internal Medicine; Nephrology  [address] [facility], [address]  [place] [address]  Phone: [phone]  Fax: [phone]  Follow Up Time: Abdoulaye Llanos)  Internal Medicine; Nephrology  [address] [facility], [address]  [place] [address]  Phone: [phone]  Fax: [phone]  Follow Up Time:     Abdoulaye Sawyer)  Urology  [address] [place] [address], [address]  [place] [address]  Phone: [phone]  Fax: [phone]  Follow Up Time:

## 2019-09-13 NOTE — DISCHARGE NOTE PROVIDER - NSDCCPCAREPLAN_GEN_ALL_CORE_FT
PRINCIPAL DISCHARGE DIAGNOSIS  Diagnosis: Acute cystitis with hematuria  Assessment and Plan of Treatment: PRINCIPAL DISCHARGE DIAGNOSIS  Diagnosis: Sepsis due to Escherichia coli  Assessment and Plan of Treatment: Bacteremia/Urinary tract infection. Complete course of oral antibiotic therapy      SECONDARY DISCHARGE DIAGNOSES  Diagnosis: Prostatitis  Assessment and Plan of Treatment: Urology: elevated PSA, had prostate biopsy with Dr. Sawyer on 9/9/2019. follow up results    Diagnosis: Lewy body dementia without behavioral disturbance  Assessment and Plan of Treatment: Continue with Aricept. Needs frequent orientation and familiar environment PRINCIPAL DISCHARGE DIAGNOSIS  Diagnosis: Sepsis due to Escherichia coli  Assessment and Plan of Treatment: Bacteremia/Urinary tract infection. Complete course of oral antibiotic therapy      SECONDARY DISCHARGE DIAGNOSES  Diagnosis: Elevated LFTs  Assessment and Plan of Treatment: Hold simvastatin and discuss with your doctor when to resume    Diagnosis: Prostatitis  Assessment and Plan of Treatment: Urology: elevated PSA, had prostate biopsy with Dr. Sawyer on 9/9/2019. follow up results    Diagnosis: Lewy body dementia without behavioral disturbance  Assessment and Plan of Treatment: Continue with Aricept. Needs frequent orientation and familiar environment

## 2019-09-13 NOTE — DISCHARGE NOTE PROVIDER - PROVIDER TOKENS
PROVIDER:[TOKEN:[[id number]]] PROVIDER:[TOKEN:[[id number]]],PROVIDER:[TOKEN:[[id number]]]

## 2019-09-13 NOTE — PROGRESS NOTE ADULT - REASON FOR ADMISSION
Complaints of generalized weakness

## 2019-09-13 NOTE — DISCHARGE NOTE PROVIDER - HOSPITAL COURSE
74y male pmh chris body Dementia, HLD, Bipolar, Pt comes to ed complains of fatigue, chills, without dysuria.comes in with fever found to have UTI.         Sepsis dt UTI/prostatitis: Seen by ID, started on Ertapenem,. Ucxk +Ecoli,    bldcx +Ecoli, Bacteremia.  secondary to prostatitis so 28 days Rx recommended    Repeat cultures on 9/12: ------------------------- can dc home on cipro 500mg PO BID with last day 10/9        Psych: bipolar dz: continue on lithium  450 mg bid per wife    Fiona dementia -stable, aricept to cont        Hyperlipidemia : continue on simvastatin 10mg qhs 74y male pmh chris body Dementia, HLD, Bipolar, Pt comes to ed complains of fatigue, chills, without dysuria.comes in with fever found to have UTI.         Sepsis dt UTI/prostatitis: Seen by ID, started on Ertapenem,. Ucxk +Ecoli,    bldcx +Ecoli, Bacteremia.  secondary to prostatitis so 28 days Rx recommended    Repeat cultures on 9/12: neg after 48h. Per ID- dc home on Cipro po 500mg PO BID with last day 10/9        Psych: bipolar dz: continue on lithium  450 mg bid per wife    Fiona dementia -stable, aricept to cont        Hyperlipidemia : elevated Lfts, pt told to hold simvastatin and discuss with his doctor when to resume

## 2019-09-13 NOTE — PROGRESS NOTE ADULT - PROBLEM SELECTOR PLAN 4
secondary to prostatitis so 28 days Rx recommended  f/u urine cx  surveillance cx ordered but NGTD  if repeat cultures negative at 48 hours (9/14-12:36) then can dc home on cipro 500mg PO BID with last day 10/9    Thank you for consulting us and involving us in the management of this most interesting and challenging case.     Please Call with any further questions

## 2019-09-14 ENCOUNTER — TRANSCRIPTION ENCOUNTER (OUTPATIENT)
Age: 74
End: 2019-09-14

## 2019-09-14 VITALS
OXYGEN SATURATION: 97 % | RESPIRATION RATE: 18 BRPM | DIASTOLIC BLOOD PRESSURE: 76 MMHG | TEMPERATURE: 98 F | HEART RATE: 52 BPM | SYSTOLIC BLOOD PRESSURE: 145 MMHG

## 2019-09-14 LAB
ALBUMIN SERPL ELPH-MCNC: 3.8 G/DL — SIGNIFICANT CHANGE UP (ref 3.3–5)
ALP SERPL-CCNC: 57 U/L — SIGNIFICANT CHANGE UP (ref 40–120)
ALT FLD-CCNC: 81 U/L — HIGH (ref 10–45)
ANION GAP SERPL CALC-SCNC: 14 MMOL/L — SIGNIFICANT CHANGE UP (ref 5–17)
AST SERPL-CCNC: 104 U/L — HIGH (ref 10–40)
BILIRUB SERPL-MCNC: 0.7 MG/DL — SIGNIFICANT CHANGE UP (ref 0.2–1.2)
BUN SERPL-MCNC: 20 MG/DL — SIGNIFICANT CHANGE UP (ref 7–23)
CALCIUM SERPL-MCNC: 11 MG/DL — HIGH (ref 8.4–10.5)
CHLORIDE SERPL-SCNC: 110 MMOL/L — HIGH (ref 96–108)
CO2 SERPL-SCNC: 23 MMOL/L — SIGNIFICANT CHANGE UP (ref 22–31)
CREAT SERPL-MCNC: 1.32 MG/DL — HIGH (ref 0.5–1.3)
GLUCOSE SERPL-MCNC: 122 MG/DL — HIGH (ref 70–99)
HCT VFR BLD CALC: 51.6 % — HIGH (ref 39–50)
HGB BLD-MCNC: 15.7 G/DL — SIGNIFICANT CHANGE UP (ref 13–17)
MCHC RBC-ENTMCNC: 29.1 PG — SIGNIFICANT CHANGE UP (ref 27–34)
MCHC RBC-ENTMCNC: 30.4 GM/DL — LOW (ref 32–36)
MCV RBC AUTO: 95.6 FL — SIGNIFICANT CHANGE UP (ref 80–100)
PLATELET # BLD AUTO: 152 K/UL — SIGNIFICANT CHANGE UP (ref 150–400)
POTASSIUM SERPL-MCNC: 4.5 MMOL/L — SIGNIFICANT CHANGE UP (ref 3.5–5.3)
POTASSIUM SERPL-SCNC: 4.5 MMOL/L — SIGNIFICANT CHANGE UP (ref 3.5–5.3)
PROT SERPL-MCNC: 7.4 G/DL — SIGNIFICANT CHANGE UP (ref 6–8.3)
RBC # BLD: 5.4 M/UL — SIGNIFICANT CHANGE UP (ref 4.2–5.8)
RBC # FLD: 13.6 % — SIGNIFICANT CHANGE UP (ref 10.3–14.5)
SODIUM SERPL-SCNC: 147 MMOL/L — HIGH (ref 135–145)
WBC # BLD: 10.32 K/UL — SIGNIFICANT CHANGE UP (ref 3.8–10.5)
WBC # FLD AUTO: 10.32 K/UL — SIGNIFICANT CHANGE UP (ref 3.8–10.5)

## 2019-09-14 RX ORDER — CIPROFLOXACIN LACTATE 400MG/40ML
1 VIAL (ML) INTRAVENOUS
Qty: 50 | Refills: 0
Start: 2019-09-14 | End: 2019-10-08

## 2019-09-14 RX ADMIN — LITHIUM CARBONATE 450 MILLIGRAM(S): 300 TABLET, EXTENDED RELEASE ORAL at 05:00

## 2019-09-14 RX ADMIN — Medication 500 MILLIGRAM(S): at 05:00

## 2019-09-14 NOTE — PHYSICAL THERAPY INITIAL EVALUATION ADULT - ADDITIONAL COMMENTS
(continuation of H&P) Pt is now weak, unable to ambulate much. Pt found to have UTI, now on Abx. (continuation of H&P) Pt is now weak, unable to ambulate much. Pt found to have UTI, now on Abx.   Pt lives with his wife in a townhouse with 3 steps to enter, + handrail and 1 flight stairs to bedroom. Pt was independent prior to falling ill but then unable to amb.

## 2019-09-14 NOTE — PHYSICAL THERAPY INITIAL EVALUATION ADULT - BALANCE TRAINING, PT EVAL
Goal: Pt will improve dynamic balance one grade to improve performance and safety of transfers, stairs and ambulation in 2 weeks.

## 2019-09-14 NOTE — DISCHARGE NOTE NURSING/CASE MANAGEMENT/SOCIAL WORK - PATIENT PORTAL LINK FT
You can access the FollowMyHealth Patient Portal offered by [facility] by registering at the following website: [email or web address]. By joining [facility] FollowMyHealth portal, you will also be able to view your health information using other applications (apps) compatible with our system.

## 2019-09-14 NOTE — PHYSICAL THERAPY INITIAL EVALUATION ADULT - PERTINENT HX OF CURRENT PROBLEM, REHAB EVAL
73 yo m pmh BPH, Lewy body dementia, presented with gradually worsening severe fatigue. Hx given by wife who provides collateral. She stated that last week on Thursday pt had LP performed as part of study at [facility] for LBD biomarkers. Afterwards he had a few episodes of  non bloody diarrhea which have resolved. Yesterday morning pt had prostate biopsy performed.

## 2019-09-14 NOTE — PHYSICAL THERAPY INITIAL EVALUATION ADULT - GENERAL OBSERVATIONS, REHAB EVAL
Pt received ambulating out of bathroom in NAD, VSS (PCA took them prior to PT arrival), + UE IV lock.

## 2019-09-17 LAB
CULTURE RESULTS: SIGNIFICANT CHANGE UP
CULTURE RESULTS: SIGNIFICANT CHANGE UP
SPECIMEN SOURCE: SIGNIFICANT CHANGE UP
SPECIMEN SOURCE: SIGNIFICANT CHANGE UP

## 2019-09-30 ENCOUNTER — APPOINTMENT (OUTPATIENT)
Dept: INFECTIOUS DISEASE | Facility: CLINIC | Age: 74
End: 2019-09-30

## 2019-10-01 PROCEDURE — 80048 BASIC METABOLIC PNL TOTAL CA: CPT

## 2019-10-01 PROCEDURE — 84132 ASSAY OF SERUM POTASSIUM: CPT

## 2019-10-01 PROCEDURE — 74177 CT ABD & PELVIS W/CONTRAST: CPT

## 2019-10-01 PROCEDURE — 82947 ASSAY GLUCOSE BLOOD QUANT: CPT

## 2019-10-01 PROCEDURE — 99285 EMERGENCY DEPT VISIT HI MDM: CPT | Mod: 25

## 2019-10-01 PROCEDURE — 87186 SC STD MICRODIL/AGAR DIL: CPT

## 2019-10-01 PROCEDURE — 82435 ASSAY OF BLOOD CHLORIDE: CPT

## 2019-10-01 PROCEDURE — 87040 BLOOD CULTURE FOR BACTERIA: CPT

## 2019-10-01 PROCEDURE — 84295 ASSAY OF SERUM SODIUM: CPT

## 2019-10-01 PROCEDURE — 93005 ELECTROCARDIOGRAM TRACING: CPT

## 2019-10-01 PROCEDURE — 97162 PT EVAL MOD COMPLEX 30 MIN: CPT

## 2019-10-01 PROCEDURE — 85027 COMPLETE CBC AUTOMATED: CPT

## 2019-10-01 PROCEDURE — 86803 HEPATITIS C AB TEST: CPT

## 2019-10-01 PROCEDURE — 81001 URINALYSIS AUTO W/SCOPE: CPT

## 2019-10-01 PROCEDURE — 83605 ASSAY OF LACTIC ACID: CPT

## 2019-10-01 PROCEDURE — 82330 ASSAY OF CALCIUM: CPT

## 2019-10-01 PROCEDURE — 83690 ASSAY OF LIPASE: CPT

## 2019-10-01 PROCEDURE — 82803 BLOOD GASES ANY COMBINATION: CPT

## 2019-10-01 PROCEDURE — 87086 URINE CULTURE/COLONY COUNT: CPT

## 2019-10-01 PROCEDURE — 85014 HEMATOCRIT: CPT

## 2019-10-01 PROCEDURE — 96365 THER/PROPH/DIAG IV INF INIT: CPT | Mod: XU

## 2019-10-01 PROCEDURE — 87150 DNA/RNA AMPLIFIED PROBE: CPT

## 2019-10-01 PROCEDURE — 80053 COMPREHEN METABOLIC PANEL: CPT

## 2021-05-19 NOTE — PROGRESS NOTE ADULT - PROBLEM/PLAN-2
Patient Quality Outreach      Summary:    Patient has the following on his problem list/HM:     Hypertension   Last three blood pressure readings:  BP Readings from Last 3 Encounters:   03/04/21 (!) 148/100   01/15/21 (!) 156/105   01/12/21 (!) 146/98     Blood pressure: Failed    HTN Guidelines:  ? 139/89     Patient is due/failing the following:   BP check    Type of outreach:    Sent letter.    Questions for provider review:    None                                                                                                                                     Denice Reece LPN on 5/19/2021 at 3:42 PM       Chart routed to none.           DISPLAY PLAN FREE TEXT

## 2025-06-20 NOTE — H&P ADULT - NSHPPOACENTRALVENOUSCATHETER_GEN_ALL_CORE
Peripheral Block    Patient location during procedure: OR  Start time: 6/20/2025 2:27 PM  Reason for block: at surgeon's request and post-op pain management  Staffing  Performed by: Irineo Almanza MD  Authorized by: Irineo Almanza MD    Preanesthetic Checklist  Completed: patient identified, IV checked, site marked, risks and benefits discussed, surgical consent, monitors and equipment checked, pre-op evaluation and timeout performed  Peripheral Block  Patient position: supine  Prep: ChloraPrep  Block type: TAP  Laterality: bilateral  Injection technique: single-shot  Procedures: ultrasound guided, Ultrasound guidance required for the procedure to increase accuracy and safety of medication placement and decrease risk of complications.  bupivacaine (PF) (MARCAINE) 0.25 % injection 20 mL - Perineural   60 mL - 6/20/2025 2:27:00 PM  Needle  Needle type: Stimuplex   Needle gauge: 20 G  Needle length: 4 in  Needle localization: ultrasound guidance  Assessment  Injection assessment: frequent aspiration, injected with ease, negative aspiration, no paresthesia on injection, incremental injection, needle tip visualized at all times, negative for heart rate change and no symptoms of intraneural/intravenous injection  Paresthesia pain: none  Post-procedure:  site cleaned  patient tolerated the procedure well with no immediate complications           no